# Patient Record
Sex: FEMALE | Race: WHITE | Employment: UNEMPLOYED | URBAN - METROPOLITAN AREA
[De-identification: names, ages, dates, MRNs, and addresses within clinical notes are randomized per-mention and may not be internally consistent; named-entity substitution may affect disease eponyms.]

---

## 2017-03-09 ENCOUNTER — OFFICE VISIT (OUTPATIENT)
Dept: BEHAVIORAL/MENTAL HEALTH CLINIC | Age: 57
End: 2017-03-09

## 2017-03-09 VITALS
BODY MASS INDEX: 25.01 KG/M2 | HEART RATE: 70 BPM | HEIGHT: 68 IN | SYSTOLIC BLOOD PRESSURE: 123 MMHG | DIASTOLIC BLOOD PRESSURE: 66 MMHG | WEIGHT: 165 LBS

## 2017-03-09 DIAGNOSIS — F33.0 MAJOR DEPRESSIVE DISORDER, RECURRENT, MILD (HCC): Primary | ICD-10-CM

## 2017-03-09 DIAGNOSIS — F43.10 POST TRAUMATIC STRESS DISORDER (PTSD): ICD-10-CM

## 2017-03-09 DIAGNOSIS — F43.21 GRIEF: ICD-10-CM

## 2017-03-09 RX ORDER — BUSPIRONE HYDROCHLORIDE 5 MG/1
5 TABLET ORAL 2 TIMES DAILY
Qty: 60 TAB | Refills: 1 | Status: SHIPPED | OUTPATIENT
Start: 2017-03-09 | End: 2017-04-20 | Stop reason: SDUPTHER

## 2017-03-09 NOTE — MR AVS SNAPSHOT
Visit Information Date & Time Provider Department Dept. Phone Encounter #  
 3/9/2017  2:30 PM Siobhan Hurst NP [facility] [phone] [id number] Follow-up Instructions Return in about 6 weeks (around 4/20/2017). Upcoming Health Maintenance Date Due Hepatitis C Screening 1960 FOBT Q 1 YEAR AGE 50-75 5/22/2008 INFLUENZA AGE 9 TO ADULT 8/1/2016 BREAST CANCER SCRN MAMMOGRAM 6/24/2018 PAP AKA CERVICAL CYTOLOGY 6/24/2019 DTaP/Tdap/Td series (2 - Td) 10/22/2023 Allergies as of 3/9/2017  Review Complete On: 3/9/2017 By: Siobhan Hurst NP Severity Noted Reaction Type Reactions Other Medication  08/25/2016    Nausea and Vomiting Steroids Current Immunizations  Reviewed on 10/22/2013 Name Date Influenza Vaccine 10/8/2013 Tdap 10/22/2013 Not reviewed this visit You Were Diagnosed With   
  
 Codes Comments Major depressive disorder, recurrent, mild ([facility]    -  Primary ICD-10-CM: F33.0 ICD-9-CM: 296.31 Post traumatic stress disorder (PTSD)     ICD-10-CM: F43.10 ICD-9-CM: 309.81 Grief     ICD-10-CM: P45.05 ICD-9-CM: 309.0 Vitals BP Pulse Height(growth percentile) Weight(growth percentile) LMP BMI  
 123/66 70 5' 8\" (1.727 m) 165 lb (74.8 kg) 12/25/2013 25.09 kg/m2 OB Status Smoking Status Postmenopausal Never Smoker BMI and BSA Data Body Mass Index Body Surface Area 25.09 kg/m 2 1.89 m 2 Preferred Pharmacy Pharmacy Name Phone [facility] [address], [address] [place] [address] [phone] Your Updated Medication List  
  
   
This list is accurate as of: 3/9/17  3:07 PM.  Always use your most recent med list.  
  
  
  
  
 busPIRone 5 mg tablet Commonly known as:  BUSPAR Take 1 Tab by mouth two (2) times a day. Prescriptions Sent to Pharmacy  Refills  
 busPIRone (BUSPAR) 5 mg tablet 1  
 Sig: Take 1 Tab by mouth two (2) times a day. Class: Normal  
 Pharmacy: [facility] [address], [address]  #: [phone] Route: Oral  
  
Follow-up Instructions Return in about 6 weeks (around 4/20/2017). Introducing \[facility]\"" & HEALTH SERVICES! Dear Malorie Martínez: 
Thank you for requesting a Decision Curve account. Our records indicate that you already have an active Decision Curve account. You can access your account anytime at [email or web address]. [email or web address] Did you know that you can access your hospital and ER discharge instructions at any time in Decision Curve? You can also review all of your test results from your hospital stay or ER visit. Additional Information If you have questions, please visit the Frequently Asked Questions section of the Decision Curve website at [email or web address] Remember, Decision Curve is NOT to be used for urgent needs. For medical emergencies, dial 911. Now available from your iPhone and Android! Please provide this summary of care documentation to your next provider. Your primary care clinician is listed as Radha Aguilar. If you have any questions after today's visit, please call [phone].

## 2017-03-09 NOTE — PROGRESS NOTES
CHIEF COMPLAINT:  Elkin Brooks is a 60 y.o. female and was seen today for follow-up of psychiatric condition and psychotropic medication management. HPI:    Black Hicks reports the following psychiatric symptoms: depression and  anxiety. The symptoms have been present for months and are of moderate severity. The depressive symptoms occur less frequently overall. Pt continues to note increased anxiety with GI distress. She has been off of SSRI for approx 30 days. Pt here today to review current treatment plan. FAMILY/SOCIAL HX: plans to re- in the near future    REVIEW OF SYSTEMS:  Psychiatric:  depression, anxiety  Appetite:good, GI distress at times   Sleep: fitful   Neuro: denies    Visit Vitals    /66    Pulse 70    Ht 5' 8\" (1.727 m)    Wt 74.8 kg (165 lb)    LMP 12/25/2013    BMI 25.09 kg/m2       Side Effects:  none, unclear if zoloft was causing some of the GI distress     MENTAL STATUS EXAM:   Sensorium  oriented to time, place and person   Relations cooperative   Appearance:  age appropriate and casually dressed   Motor Behavior:  gait stable and within normal limits   Speech:  normal volume   Thought Process: goal directed   Thought Content free of delusions and free of hallucinations   Suicidal ideations no intention   Homicidal ideations no intention   Mood:  anxiuous   Affect:  anxious   Memory recent  adequate   Memory remote:  adequate   Concentration:  adequate   Abstraction:  abstract   Insight:  fair and good   Reliability good and fair   Judgment:  fair and good     MEDICAL DECISION MAKING:  Problems addressed today:    ICD-10-CM ICD-9-CM    1. Major depressive disorder, recurrent, mild (HCC) F33.0 296.31    2. Post traumatic stress disorder (PTSD) F43.10 309.81    3. Grief F43.20 309.0        Assessment:   Black Hicks is not responding to treatment, symptoms are currently exacerbated. Pt reports depressive symptoms are fairly stable overall.  She has noticed an increase in anxiety. She was not able to tolerate zoloft due to GI SE's and so has stopped use. Reviewed past symptom presentations and meds. At this time will focus primarily on anxiety. Discussed options and will use buspar. Answered quesitons. Pt discussed current stressors. Provided support and encouragement. Plan:   1. Current Outpatient Prescriptions   Medication Sig Dispense Refill    busPIRone (BUSPAR) 5 mg tablet Take 1 Tab by mouth two (2) times a day. 60 Tab 1          medication changes made today: dc zoloft, start buspar 5 mg bid for anxiety    2. Counseling and coordination of care including instructions for treatment, risks/benefits, risk factor reduction and patient/family education. She agrees with the plan. Patient instructed to call with any side effects, questions or issues. 3.  Follow-up Disposition:  Return in about 6 weeks (around 4/20/2017).      4. Ind therapy prn    3/9/2017  Bob Fall NP

## 2017-04-20 ENCOUNTER — OFFICE VISIT (OUTPATIENT)
Dept: BEHAVIORAL/MENTAL HEALTH CLINIC | Age: 57
End: 2017-04-20

## 2017-04-20 VITALS
SYSTOLIC BLOOD PRESSURE: 120 MMHG | WEIGHT: 158 LBS | BODY MASS INDEX: 23.95 KG/M2 | HEART RATE: 60 BPM | HEIGHT: 68 IN | DIASTOLIC BLOOD PRESSURE: 64 MMHG

## 2017-04-20 DIAGNOSIS — F43.21 GRIEF: ICD-10-CM

## 2017-04-20 DIAGNOSIS — F43.10 POST TRAUMATIC STRESS DISORDER (PTSD): ICD-10-CM

## 2017-04-20 DIAGNOSIS — F33.0 MAJOR DEPRESSIVE DISORDER, RECURRENT, MILD (HCC): Primary | ICD-10-CM

## 2017-04-20 RX ORDER — BUSPIRONE HYDROCHLORIDE 10 MG/1
10 TABLET ORAL DAILY
Qty: 30 TAB | Refills: 3 | Status: SHIPPED | OUTPATIENT
Start: 2017-04-20 | End: 2017-08-16

## 2017-04-20 NOTE — PROGRESS NOTES
CHIEF COMPLAINT:  Yolis Michele is a 60 y.o. female and was seen today for follow-up of psychiatric condition and psychotropic medication management. HPI:    Yesenia Clayton reports the following psychiatric symptoms:  depression and anxiety. The depressive symptoms have been stable but anxiety symptoms have been present for several months and are of moderate/high severity. The symptoms occur daily. Pt reports some benefit from current medications. Pt here today to review current treatment plan. FAMILY/SOCIAL HX: concerned about primary relationship    REVIEW OF SYSTEMS:  Psychiatric:  depression, anxiety  Appetite:decreased   Sleep: good overall  Neuro: denies    Visit Vitals    /64    Pulse 60    Ht 5' 8\" (1.727 m)    Wt 71.7 kg (158 lb)    LMP 12/25/2013    BMI 24.02 kg/m2       Side Effects:  none currently    MENTAL STATUS EXAM:   Sensorium  oriented to time, place and person   Relations cooperative   Appearance:  age appropriate and casually dressed   Motor Behavior:  gait stable and within normal limits   Speech:  normal volume   Thought Process: goal directed   Thought Content free of delusions and free of hallucinations   Suicidal ideations no intention   Homicidal ideations no intention   Mood:  anxious   Affect:  anxious   Memory recent  adequate   Memory remote:  adequate   Concentration:  adequate   Abstraction:  abstract   Insight:  fair and good   Reliability good   Judgment:  fair and good     MEDICAL DECISION MAKING:  Problems addressed today:    ICD-10-CM ICD-9-CM    1. Major depressive disorder, recurrent, mild (HCC) F33.0 296.31    2. Post traumatic stress disorder (PTSD) F43.10 309.81    3. Grief F43.20 309.0        Assessment:   Yesenia Clayton is responding to treatment, anxiety symptoms are still exacerbated. Pt reports benefit from use of buspar. She has been consistently using 5 mg tab and at times has taken an extra 1-2 tabs depending on anxiety symptoms.  Will increase daily dose to 10 mg and pt may take an extra tab if needed. Pt also with sig stressors. Discussed strategies for enhanced coping. Reviewed healthy boundaries and cognitions. Pt open and engaged in practice. Provided support and encouragement. Plan:   1. Current Outpatient Prescriptions   Medication Sig Dispense Refill    busPIRone (BUSPAR) 10 mg tablet Take 1 Tab by mouth daily. 30 Tab 3          medication changes made today: increase buspar 10 mg daily    2. Counseling and coordination of care including instructions for treatment, risks/benefits, risk factor reduction and patient/family education. She agrees with the plan. Patient instructed to call with any side effects, questions or issues.      3.  Follow-up Disposition:  Return in about 2 weeks (around 5/4/2017).    4/20/2017  Chris Pappas NP

## 2017-04-20 NOTE — MR AVS SNAPSHOT
Visit Information Date & Time Provider Department Dept. Phone Encounter #  
 4/20/2017  9:30 AM Louis Churchill NP [address] [facility] [phone] [id number] Follow-up Instructions Return in about 2 weeks (around 5/4/2017). Upcoming Health Maintenance Date Due Hepatitis C Screening 1960 FOBT Q 1 YEAR AGE 50-75 5/22/2008 INFLUENZA AGE 9 TO ADULT 8/1/2016 BREAST CANCER SCRN MAMMOGRAM 6/24/2018 PAP AKA CERVICAL CYTOLOGY 6/24/2019 DTaP/Tdap/Td series (2 - Td) 10/22/2023 Allergies as of 4/20/2017  Review Complete On: 4/20/2017 By: Louis Churchill NP Severity Noted Reaction Type Reactions Other Medication  08/25/2016    Nausea and Vomiting Steroids Current Immunizations  Reviewed on 10/22/2013 Name Date Influenza Vaccine 10/8/2013 Tdap 10/22/2013 Not reviewed this visit You Were Diagnosed With   
  
 Codes Comments Major depressive disorder, recurrent, mild ([facility]    -  Primary ICD-10-CM: F33.0 ICD-9-CM: 296.31 Post traumatic stress disorder (PTSD)     ICD-10-CM: F43.10 ICD-9-CM: 309.81 Grief     ICD-10-CM: D41.05 ICD-9-CM: 309.0 Vitals BP Pulse Height(growth percentile) Weight(growth percentile) LMP BMI  
 120/64 60 5' 8\" (1.727 m) 158 lb (71.7 kg) 12/25/2013 24.02 kg/m2 OB Status Smoking Status Postmenopausal Never Smoker BMI and BSA Data Body Mass Index Body Surface Area 24.02 kg/m 2 1.85 m 2 Preferred Pharmacy Pharmacy Name Phone [facility] [address], [address] [place] [address] [phone] Your Updated Medication List  
  
   
This list is accurate as of: 4/20/17 10:04 AM.  Always use your most recent med list.  
  
  
  
  
 busPIRone 10 mg tablet Commonly known as:  BUSPAR Take 1 Tab by mouth daily. Prescriptions Sent to Pharmacy  Refills  
 busPIRone (BUSPAR) 10 mg tablet 3  
 Sig: Take 1 Tab by mouth daily. Class: Normal  
 Pharmacy: [facility] [address], [address] [facility] #: [phone] Route: Oral  
  
Follow-up Instructions Return in about 2 weeks (around 5/4/2017). Introducing [facility] SERVICES! Dear Susan De León: 
Thank you for requesting a CloudMedx account. Our records indicate that you already have an active CloudMedx account. You can access your account anytime at [email or web address] [employer]/Decision Diagnostics Did you know that you can access your hospital and ER discharge instructions at any time in CloudMedx? You can also review all of your test results from your hospital stay or ER visit. Additional Information If you have questions, please visit the Frequently Asked Questions section of the CloudMedx website at [email or web address] Remember, CloudMedx is NOT to be used for urgent needs. For medical emergencies, dial 911. Now available from your iPhone and Android! Please provide this summary of care documentation to your next provider. Your primary care clinician is listed as Marilou Dials. If you have any questions after today's visit, please call [phone].

## 2017-05-10 ENCOUNTER — OFFICE VISIT (OUTPATIENT)
Dept: BEHAVIORAL/MENTAL HEALTH CLINIC | Age: 57
End: 2017-05-10

## 2017-05-10 VITALS
DIASTOLIC BLOOD PRESSURE: 63 MMHG | BODY MASS INDEX: 23.79 KG/M2 | WEIGHT: 157 LBS | SYSTOLIC BLOOD PRESSURE: 106 MMHG | HEIGHT: 68 IN | HEART RATE: 61 BPM

## 2017-05-10 DIAGNOSIS — F33.0 MAJOR DEPRESSIVE DISORDER, RECURRENT, MILD (HCC): Primary | ICD-10-CM

## 2017-05-10 DIAGNOSIS — F43.10 POST TRAUMATIC STRESS DISORDER (PTSD): ICD-10-CM

## 2017-05-10 DIAGNOSIS — F43.21 GRIEF: ICD-10-CM

## 2017-05-10 NOTE — MR AVS SNAPSHOT
Visit Information Date & Time Provider Department Dept. Phone Encounter #  
 5/10/2017  2:30 PM Kj Wise NP [place] [address] [phone] [id number] Follow-up Instructions Return in about 4 weeks (around 6/7/2017). Upcoming Health Maintenance Date Due Hepatitis C Screening 1960 FOBT Q 1 YEAR AGE 50-75 5/22/2008 INFLUENZA AGE 9 TO ADULT 8/1/2017 BREAST CANCER SCRN MAMMOGRAM 6/24/2018 PAP AKA CERVICAL CYTOLOGY 6/24/2019 DTaP/Tdap/Td series (2 - Td) 10/22/2023 Allergies as of 5/10/2017  Review Complete On: 5/10/2017 By: Kj Wise NP Severity Noted Reaction Type Reactions Other Medication  08/25/2016    Nausea and Vomiting Steroids Current Immunizations  Reviewed on 10/22/2013 Name Date Influenza Vaccine 10/8/2013 Tdap 10/22/2013 Not reviewed this visit You Were Diagnosed With   
  
 Codes Comments Major depressive disorder, recurrent, mild ([facility]    -  Primary ICD-10-CM: F33.0 ICD-9-CM: 296.31 Post traumatic stress disorder (PTSD)     ICD-10-CM: F43.10 ICD-9-CM: 309.81 Grief     ICD-10-CM: P92.48 ICD-9-CM: 309.0 Vitals BP Pulse Height(growth percentile) Weight(growth percentile) LMP BMI  
 106/63 61 5' 8\" (1.727 m) 157 lb (71.2 kg) 12/25/2013 23.87 kg/m2 OB Status Smoking Status Postmenopausal Never Smoker BMI and BSA Data Body Mass Index Body Surface Area  
 23.87 kg/m 2 1.85 m 2 Preferred Pharmacy Pharmacy Name Phone [facility] [address], [address] [place] [address] Avenue [phone] Your Updated Medication List  
  
   
This list is accurate as of: 5/10/17  3:07 PM.  Always use your most recent med list.  
  
  
  
  
 busPIRone 10 mg tablet Commonly known as:  BUSPAR Take 1 Tab by mouth daily. Follow-up Instructions Return in about 4 weeks (around 6/7/2017). Introducing [facility] & HEALTH SERVICES! Dear Shereen Rangel: 
Thank you for requesting a Sparxent account. Our records indicate that you already have an active Sparxent account. You can access your account anytime at [email or web address] Did you know that you can access your hospital and ER discharge instructions at any time in Sparxent? You can also review all of your test results from your hospital stay or ER visit. Additional Information If you have questions, please visit the Frequently Asked Questions section of the Sparxent website at [email or web address] Remember, Sparxent is NOT to be used for urgent needs. For medical emergencies, dial 911. Now available from your iPhone and Android! Please provide this summary of care documentation to your next provider. Your primary care clinician is listed as Osvaldo Dale. If you have any questions after today's visit, please call [phone].

## 2017-05-10 NOTE — PROGRESS NOTES
CHIEF COMPLAINT:  Danielle Copeland is a 60 y.o. female and was seen today for follow-up of psychiatric condition and psychotropic medication management. HPI:    Terrence Ozuna reports the following psychiatric symptoms:  depression and anxiety. The symptoms have been present for several weeks and are of moderate severity. The symptoms occur somewhat less severely with use of buspar. Pt here today for therapy and to review current medication. REVIEW OF SYSTEMS:  Psychiatric:  depression, anxiety  Appetite:improved   Sleep: improved     Side Effects:  none    MENTAL STATUS EXAM:   Sensorium  oriented to time, place and person   Relations cooperative   Appearance:  age appropriate and casually dressed   Motor Behavior:  gait stable and within normal limits   Speech:  normal volume   Thought Process: goal directed   Thought Content free of delusions and free of hallucinations   Suicidal ideations no intention   Homicidal ideations no intention   Mood:  anxious   Affect:  anxious   Memory recent  adequate   Memory remote:  adequate   Concentration:  adequate   Abstraction:  abstract   Insight:  Fair/good   Reliability good   Judgment:  good         MEDICAL DECISION MAKING:  Problems addressed today:    ICD-10-CM ICD-9-CM    1. Major depressive disorder, recurrent, mild (HCC) F33.0 296.31    2. Post traumatic stress disorder (PTSD) F43.10 309.81    3. Grief F43.20 309.0        Assessment:   Terrence Ozuna is responding to treatment, symptoms are stabilizing. Will cont with current medication. Plan:   1. Current Outpatient Prescriptions   Medication Sig Dispense Refill    busPIRone (BUSPAR) 10 mg tablet Take 1 Tab by mouth daily. 30 Tab 3          medication changes: cont buspar 10 mg daily    2. Counseling and coordination of care including instructions for treatment, risks/benefits, risk factor reduction and patient/family education. She agrees with the plan.  Patient instructed to call with any side effects, questions or issues. 3.  Follow-up Disposition:  Return in about 4 weeks (around 6/7/2017). PSYCHOTHERAPY:  approx 20 minutes  Type:  Supportive, cognitive, psychodynamic  Focus:  Relationship with her mother, view of self  Maylissabecka Ozuna is progressing. Pt continues to be open and engaged.      Jared Jenkins NP  5/10/2017

## 2017-08-08 ENCOUNTER — OFFICE VISIT (OUTPATIENT)
Dept: INTERNAL MEDICINE CLINIC | Age: 57
End: 2017-08-08

## 2017-08-08 VITALS
HEART RATE: 74 BPM | OXYGEN SATURATION: 98 % | TEMPERATURE: 98 F | RESPIRATION RATE: 14 BRPM | BODY MASS INDEX: 23.52 KG/M2 | DIASTOLIC BLOOD PRESSURE: 78 MMHG | HEIGHT: 68 IN | WEIGHT: 155.2 LBS | SYSTOLIC BLOOD PRESSURE: 116 MMHG

## 2017-08-08 DIAGNOSIS — M79.7 FIBROMYALGIA: ICD-10-CM

## 2017-08-08 DIAGNOSIS — Z66 DNR (DO NOT RESUSCITATE): ICD-10-CM

## 2017-08-08 DIAGNOSIS — Z12.31 ENCOUNTER FOR SCREENING MAMMOGRAM FOR MALIGNANT NEOPLASM OF BREAST: ICD-10-CM

## 2017-08-08 DIAGNOSIS — D22.9 NUMEROUS MOLES: ICD-10-CM

## 2017-08-08 DIAGNOSIS — N39.0 URINARY TRACT INFECTION WITHOUT HEMATURIA, SITE UNSPECIFIED: ICD-10-CM

## 2017-08-08 DIAGNOSIS — Z00.00 MEDICARE ANNUAL WELLNESS VISIT, SUBSEQUENT: Primary | ICD-10-CM

## 2017-08-08 DIAGNOSIS — E04.2 MULTIPLE THYROID NODULES: ICD-10-CM

## 2017-08-08 LAB
BILIRUB UR QL STRIP: NEGATIVE
GLUCOSE UR-MCNC: NEGATIVE MG/DL
KETONES P FAST UR STRIP-MCNC: NEGATIVE MG/DL
PH UR STRIP: 5.5 [PH] (ref 4.6–8)
PROT UR QL STRIP: NEGATIVE MG/DL
SP GR UR STRIP: 1.02 (ref 1–1.03)
UA UROBILINOGEN AMB POC: NORMAL (ref 0.2–1)
URINALYSIS CLARITY POC: CLEAR
URINALYSIS COLOR POC: NORMAL
URINE BLOOD POC: NEGATIVE
URINE LEUKOCYTES POC: NEGATIVE
URINE NITRITES POC: NEGATIVE

## 2017-08-08 NOTE — PROGRESS NOTES
HISTORY OF PRESENT ILLNESS  Minus Sergio is a 59 y.o. female. HPI  Pyelonephritis Follow-up  She was treated in ED 17 in [place] for Pyleo. Report resolved symptoms: no longer has back pain or  dysuria. FIbromyalgia  No longer having improvement with Cymbalta. She would like transition to another provider. 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 History     Past Medical History:   Diagnosis Date    Depression     FH: diabetes mellitus     Fibromyalgia     Hypercholesterolemia     Stroke [facility]) 5848,85, 2012    right sided      Past Surgical History:   Procedure Laterality Date    HX  SECTION      HX TONSILLECTOMY       Current Outpatient Prescriptions   Medication Sig Dispense Refill    busPIRone (BUSPAR) 10 mg tablet Take 1 Tab by mouth daily.  30 Tab 3     Allergies   Allergen Reactions    Other Medication Nausea and Vomiting     Steroids     Family History   Problem Relation Age of Onset    Diabetes Mother     Diabetes Father     Cancer Father     Lung Disease Sister      pulmonary fibrosis, on O2    Diabetes Brother      Social History   Substance Use Topics    Smoking status: Never Smoker    Smokeless tobacco: Never Used    Alcohol use No     Patient Active Problem List   Diagnosis Code    FH: diabetes mellitus Z83.3    Fibromyalgia M79.7    Encounter for long-term (current) use of other medications Z79.899    Major depressive disorder, recurrent episode, in partial remission ([facility] F33.41    Post traumatic stress disorder (PTSD) F43.10    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 No    Do you average more than 7 drinks per week? No        Functional Ability and Level of Safety:     Hearing Loss   none    Activities of Daily Living   Self-care. Requires assistance with: no ADLs    Fall Risk     Fall Risk Assessment, last 12 mths 8/8/2017   Able to walk? Yes   Fall in past 12 months? No     Abuse Screen   Patient is not abused      Review of Systems   Constitutional: Negative for chills, fever and weight loss. HENT: Negative for congestion and sore throat. Eyes: Negative for blurred vision and double vision. Respiratory: Negative for cough and shortness of breath. Cardiovascular: Negative for chest pain, orthopnea and leg swelling. Gastrointestinal: Negative for abdominal pain, blood in stool, constipation, diarrhea, heartburn, nausea and vomiting. Genitourinary: Negative for frequency and urgency. Musculoskeletal: Negative for joint pain and myalgias. Neurological: Negative for dizziness, tremors, weakness and headaches. Endo/Heme/Allergies: Does not bruise/bleed easily. Psychiatric/Behavioral: Negative for depression and suicidal ideas. Physical Exam   Constitutional: She is oriented to person, place, and time. She appears well-developed and well-nourished. HENT:   Right Ear: External ear normal.   Left Ear: External ear normal.   Mouth/Throat: Oropharynx is clear and moist. No oropharyngeal exudate. Eyes: Conjunctivae are normal. No scleral icterus. Neck: Normal range of motion. Neck supple. No thyromegaly present. Cardiovascular: Normal rate, regular rhythm and normal heart sounds. Exam reveals no gallop and no friction rub.     No murmur heard.  Pulmonary/Chest: Effort normal and breath sounds normal. No respiratory distress. She has no wheezes. She has no rales. She exhibits no tenderness. Abdominal: Soft. Bowel sounds are normal. She exhibits no distension. There is no tenderness. There is no rebound and no guarding. Genitourinary:   Genitourinary Comments: Deferred for gyn per pt request   Musculoskeletal: Normal range of motion. She exhibits no edema or tenderness. Neurological: She is alert and oriented to person, place, and time. Skin:        Psychiatric: She has a normal mood and affect. Happy        Evaluation of Cognitive Function:  Mood/affect:  happy  Appearance: well dressed  Family member/caregiver input: n/a       Patient Care Team:  Aidan Quintero MD as PCP - General (Internal Medicine)  Coleman Batista MD (Endocrinology)  Chester Griggs MD (Rheumatology)        ASSESSMENT and PLAN  Advice/Referrals/Counseling   Education and counseling provided:  Are appropriate based on today's review and evaluation  End-of-Life planning (with patient's consent)      Assessment/Plan   Diagnoses and all orders for this visit:    1. Medicare annual wellness visit, subsequent- Health Maintenance reviewed - patient asked to schedule her mammogram, and dermatology appt for skin check. 2. Urinary tract infection without hematuria, site unspecified- resolved pyelo  -     OCCULT BLOOD, IMMUNOASSAY (FIT)  -     AMB POC URINALYSIS DIP STICK AUTO W/O MICRO    3. Fibromyalgia- would like to transition care. Currently not on pain medication. Using lifestyle management   -     REFERRAL TO RHEUMATOLOGY    4. Multiple thyroid nodules-per Endocrinology     5. DNR (do not resuscitate)- placed on file      Follow-up Disposition:  Return in about 1 year (around 8/8/2018) for Medicare Wellness, Physical - 30 minute appointment. Medication risks/benefits/costs/interactions/alternatives discussed with patient.   Jessica Neff  was given an after visit summary which includes diagnoses, current medications, & vitals. she expressed understanding with the diagnosis and plan.

## 2017-08-08 NOTE — MR AVS SNAPSHOT
Visit Information Date & Time Provider Department Dept. Phone Encounter #  
 8/8/2017  9:30 AM Marilee Lehman MD [facility] Internal Medicine [phone] [id number] Follow-up Instructions Return in about 1 year (around 8/8/2018) for Medicare Wellness, Physical - 30 minute appointment. Upcoming Health Maintenance Date Due Hepatitis C Screening 1960 FOBT Q 1 YEAR AGE 50-75 5/22/2008 INFLUENZA AGE 9 TO ADULT 8/1/2017 BREAST CANCER SCRN MAMMOGRAM 6/24/2018 PAP AKA CERVICAL CYTOLOGY 6/24/2019 DTaP/Tdap/Td series (2 - Td) 10/22/2023 Allergies as of 8/8/2017  Review Complete On: 8/8/2017 By: Marilee Lehman MD  
  
 Severity Noted Reaction Type Reactions Other Medication  08/25/2016    Nausea and Vomiting Steroids Current Immunizations  Reviewed on 10/22/2013 Name Date Influenza Vaccine 10/8/2013 Tdap 10/22/2013 Not reviewed this visit You Were Diagnosed With   
  
 Codes Comments Medicare annual wellness visit, subsequent    -  Primary ICD-10-CM: Z00.00 ICD-9-CM: V70.0 Urinary tract infection without hematuria, site unspecified     ICD-10-CM: N39.0 ICD-9-CM: 599.0 Fibromyalgia     ICD-10-CM: M79.7 ICD-9-CM: 729.1 Multiple thyroid nodules     ICD-10-CM: E04.2 ICD-9-CM: 771. 1 Vitals BP Pulse Temp Resp Height(growth percentile) Weight(growth percentile) 116/78 (BP 1 Location: Right arm, BP Patient Position: Sitting) 74 98 °F (36.7 °C) (Oral) 14 5' 8\" (1.727 m) 155 lb 3.2 oz (70.4 kg) LMP SpO2 BMI OB Status Smoking Status 12/25/2013 98% 23.6 kg/m2 Postmenopausal Never Smoker Vitals History BMI and BSA Data Body Mass Index Body Surface Area  
 23.6 kg/m 2 1.84 m 2 Preferred Pharmacy Pharmacy Name Phone [address] [address] [phone] Your Updated Medication List  
  
   
 This list is accurate as of: 8/8/17 10:07 AM.  Always use your most recent med list.  
  
  
  
  
 busPIRone 10 mg tablet Commonly known as:  BUSPAR Take 1 Tab by mouth daily. We Performed the Following AMB POC URINALYSIS DIP STICK AUTO W/O MICRO [18273 CPT(R)] OCCULT BLOOD, IMMUNOASSAY (FIT) [id number] CPT(R)] REFERRAL TO RHEUMATOLOGY [FAC12 Custom] Follow-up Instructions Return in about 1 year (around 8/8/2018) for Medicare Wellness, Physical - 30 minute appointment. Referral Information Referral ID Referred By Referred To  
  
 [id number] Vinicio MORRIS MD   
   [address] [place], [address] [place] [address] Phone: [phone] Fax: [phone] Visits Status Start Date End Date 1 New Request 8/8/17 8/8/18 If your referral has a status of pending review or denied, additional information will be sent to support the outcome of this decision. Patient Instructions It was a pleasure to see you again! I'm glad you are better. Congratulations on your upcoming wedding! I will obtain your labs from your endocrinologist  
 
Please provide a copy your living will. Fecal Occult Blood Test (FOBT): About This Test 
What is it? A fecal occult blood test checks for hidden (occult) blood in the stool. You place a small sample of stool on a chemically treated card, pad, or wipe. Then a special chemical solution is put on top of the sample. If the card, pad, or wipe turns blue, there is blood in the stool sample. Why is this test done? A fecal occult blood test is done to check for colorectal cancer and other types of gastrointestinal problems. These include hemorrhoids, anal fissures, and colon polyps, which can cause blood in the stools. How can you prepare for the test? 
Before you do a fecal occult blood test, avoid the following for 2 to 3 days before the test: · Eating turnips, beets, radishes, horseradish, artichokes, mushrooms, broccoli, bean sprouts, cauliflower, apples, oranges, bananas, grapes, and melon. These foods can cause the test to be positive for blood when blood is not in the stool. · Eating red meat. This may cause false test results. Small amounts of chicken, turkey, or fish will not affect the test. 
· Taking iron supplements · Taking aspirin (or products that contain aspirin) and nonsteroidal anti-inflammatory drugs (NSAIDs). Also avoid taking other medicines that irritate the stomach or intestines. · Taking vitamin C supplements Do not do the test during your menstrual period or if you are having bleeding from hemorrhoids. And don't test a stool sample that has been in contact with toilet bowl cleaning products that turn the water blue. What happens during the test? 
You can check for blood in your stool at home. There are different types of home tests you can use. Make sure to follow the instructions that come with any test. For most tests, you will use stool samples from three different bowel movements over three different days. General instructions For any home test, follow these guidelines: 
· Check the expiration date on the package. Do not use a test kit after its expiration date. The chemicals in the kit may not work properly after that date. · Store the test kit as instructed. Many kits need to be stored in a refrigerator or cool place. · Read all of the instructions for your test closely before doing the test. Be aware of any special things you need to do before the test. This may include not eating certain foods or limiting your physical activity. · Follow the instructions exactly. Do all the steps in order, without skipping any of them. · If a step in the test needs to be timed, use a watch. Don't guess at the timing.  
· If you are color-blind or have trouble seeing colors, have someone else read the test results for you. Most test results are color changes on a test strip. · Record the results of the test so you can discuss them with your doctor. Stool guaiac cards These instructions are for one of the most common tests used to find blood in the stool. · Complete the information on the front of each card. · During a bowel movement, collect a small amount of stool on one end of an applicator. You might try catching the stool on some plastic wrap draped loosely over the toilet bowl and held in place by the toilet seat. If you use a container to collect the stool, first clean and rinse it well. This will get rid of any substance that may affect the test results. · Apply a thin smear of stool inside box A. 
· Reuse the same applicator to get a second sample from a different part of the stool. Apply a thin smear inside box B. 
· Close the cover of the slide. · Complete the remaining two cards in the same way for two other bowel movements. · Return all the samples right after you collect the last sample. Other test kits · Some kits tell you to use a special cloth to wipe with after a bowel movement. After you wipe with the cloth, you put the developer solution on it to check for a color change that means there is blood in the stool. · Other kits have a special test pad that you place in the toilet after a bowel movement. The pad will change color if the stool has blood in it. What else should you know about the test? 
A fecal occult blood test may be used to check for colorectal cancer. But it is never used to diagnose this condition. If a fecal occult blood test finds blood in the stool, you may need more tests to find the reason for the bleeding. Follow-up care is a key part of your treatment and safety. Be sure to make and go to all appointments, and call your doctor if you are having problems. It's also a good idea to keep a list of the medicines you take. Where can you learn more? Go to http://bubba-garima.info/. Enter C622 in the search box to learn more about \"Fecal Occult Blood Test (FOBT): About This Test.\" Current as of: March 16, 2017 Content Version: 11.3 © 4585-0353 [employer]. Care instructions adapted under license by [employer] (which disclaims liability or warranty for this information). If you have questions about a medical condition or this instruction, always ask your healthcare professional. [address] any warranty or liability for your use of this information. Introducing [facility]! Dear Josh Gleason: 
Thank you for requesting a "[employer] account. Our records indicate that you already have an active "[employer] account. You can access your account anytime at [email or web address] Did you know that you can access your hospital and ER discharge instructions at any time in "[employer]? You can also review all of your test results from your hospital stay or ER visit. Additional Information If you have questions, please visit the Frequently Asked Questions section of the "[employer] website at [email or web address] Remember, "[employer] is NOT to be used for urgent needs. For medical emergencies, dial 911. Now available from your iPhone and Android! Please provide this summary of care documentation to your next provider. Your primary care clinician is listed as Kade Begum. If you have any questions after today's visit, please call [phone].

## 2017-08-08 NOTE — PATIENT INSTRUCTIONS
It was a pleasure to see you again! I'm glad you are better. Congratulations on your upcoming wedding! I will obtain your labs from your endocrinologist     Please provide a copy your living will. Fecal Occult Blood Test (FOBT): About This Test  What is it? A fecal occult blood test checks for hidden (occult) blood in the stool. You place a small sample of stool on a chemically treated card, pad, or wipe. Then a special chemical solution is put on top of the sample. If the card, pad, or wipe turns blue, there is blood in the stool sample. Why is this test done? A fecal occult blood test is done to check for colorectal cancer and other types of gastrointestinal problems. These include hemorrhoids, anal fissures, and colon polyps, which can cause blood in the stools. How can you prepare for the test?  Before you do a fecal occult blood test, avoid the following for 2 to 3 days before the test:  · Eating turnips, beets, radishes, horseradish, artichokes, mushrooms, broccoli, bean sprouts, cauliflower, apples, oranges, bananas, grapes, and melon. These foods can cause the test to be positive for blood when blood is not in the stool. · Eating red meat. This may cause false test results. Small amounts of chicken, turkey, or fish will not affect the test.  · Taking iron supplements  · Taking aspirin (or products that contain aspirin) and nonsteroidal anti-inflammatory drugs (NSAIDs). Also avoid taking other medicines that irritate the stomach or intestines. · Taking vitamin C supplements  Do not do the test during your menstrual period or if you are having bleeding from hemorrhoids. And don't test a stool sample that has been in contact with toilet bowl cleaning products that turn the water blue. What happens during the test?  You can check for blood in your stool at home. There are different types of home tests you can use.  Make sure to follow the instructions that come with any test. For most tests, you will use stool samples from three different bowel movements over three different days. General instructions  For any home test, follow these guidelines:  · Check the expiration date on the package. Do not use a test kit after its expiration date. The chemicals in the kit may not work properly after that date. · Store the test kit as instructed. Many kits need to be stored in a refrigerator or cool place. · Read all of the instructions for your test closely before doing the test. Be aware of any special things you need to do before the test. This may include not eating certain foods or limiting your physical activity. · Follow the instructions exactly. Do all the steps in order, without skipping any of them. · If a step in the test needs to be timed, use a watch. Don't guess at the timing. · If you are color-blind or have trouble seeing colors, have someone else read the test results for you. Most test results are color changes on a test strip. · Record the results of the test so you can discuss them with your doctor. Stool guaiac cards  These instructions are for one of the most common tests used to find blood in the stool. · Complete the information on the front of each card. · During a bowel movement, collect a small amount of stool on one end of an applicator. You might try catching the stool on some plastic wrap draped loosely over the toilet bowl and held in place by the toilet seat. If you use a container to collect the stool, first clean and rinse it well. This will get rid of any substance that may affect the test results. · Apply a thin smear of stool inside box A.  · Reuse the same applicator to get a second sample from a different part of the stool. Apply a thin smear inside box B.  · Close the cover of the slide. · Complete the remaining two cards in the same way for two other bowel movements. · Return all the samples right after you collect the last sample.   Other test kits  · Some kits tell you to use a special cloth to wipe with after a bowel movement. After you wipe with the cloth, you put the developer solution on it to check for a color change that means there is blood in the stool. · Other kits have a special test pad that you place in the toilet after a bowel movement. The pad will change color if the stool has blood in it. What else should you know about the test?  A fecal occult blood test may be used to check for colorectal cancer. But it is never used to diagnose this condition. If a fecal occult blood test finds blood in the stool, you may need more tests to find the reason for the bleeding. Follow-up care is a key part of your treatment and safety. Be sure to make and go to all appointments, and call your doctor if you are having problems. It's also a good idea to keep a list of the medicines you take. Where can you learn more? Go to http://bubba-garima.info/. Enter N650 in the search box to learn more about \"Fecal Occult Blood Test (FOBT): About This Test.\"  Current as of: March 16, 2017  Content Version: 11.3  © 9807-2030 [employer]. Care instructions adapted under license by [employer] (which disclaims liability or warranty for this information). If you have questions about a medical condition or this instruction, always ask your healthcare professional. [address] any warranty or liability for your use of this information.

## 2017-08-08 NOTE — ACP (ADVANCE CARE PLANNING)
Advance Care Planning (ACP) Provider Note - Comprehensive     Date of ACP Conversation: 08/08/17  Persons included in Conversation:  patient  Length of ACP Conversation in minutes:  16 minutes    Authorized Decision Maker (if patient is incapable of making informed decisions): This person is:  Lupe Estrella, daughter          General ACP for ALL Patients with Decision Making Capacity:   Importance of advance care planning, including choosing a healthcare agent to communicate patient's healthcare decisions if patient lost the ability to make decisions, such as after a sudden illness or accident    Review of Existing Advance Directive:  Has Living Will     For Serious or Chronic Illness:  Understanding of CPR, goals and expected outcomes, benefits and burdens discussed. Interventions Provided:  Entered DNR order (If yes, complete Durable DNR form)   \"i'm good I know where' I'm going\"  Has discussed with her daughter.

## 2017-08-16 ENCOUNTER — OFFICE VISIT (OUTPATIENT)
Dept: BEHAVIORAL/MENTAL HEALTH CLINIC | Age: 57
End: 2017-08-16

## 2017-08-16 VITALS
DIASTOLIC BLOOD PRESSURE: 62 MMHG | BODY MASS INDEX: 23.64 KG/M2 | HEART RATE: 55 BPM | WEIGHT: 156 LBS | SYSTOLIC BLOOD PRESSURE: 106 MMHG | HEIGHT: 68 IN

## 2017-08-16 DIAGNOSIS — F43.10 POST TRAUMATIC STRESS DISORDER (PTSD): ICD-10-CM

## 2017-08-16 DIAGNOSIS — F32.5 MAJOR DEPRESSION IN REMISSION (HCC): Primary | ICD-10-CM

## 2017-08-16 DIAGNOSIS — F43.21 GRIEF: ICD-10-CM

## 2017-08-16 RX ORDER — SERTRALINE HYDROCHLORIDE 25 MG/1
25 TABLET, FILM COATED ORAL DAILY
Qty: 30 TAB | Refills: 1 | Status: SHIPPED | OUTPATIENT
Start: 2017-08-16 | End: 2017-10-12 | Stop reason: SDUPTHER

## 2017-08-16 NOTE — PROGRESS NOTES
CHIEF COMPLAINT:  Joseph Lam is a 59 y.o. female and was seen today for follow-up of psychiatric condition and psychotropic medication management. HPI:    Jack Parikh reports the following psychiatric symptoms:  depression and anxiety. The symptoms have been present for months and are of moderate severity. The symptoms occur somewhat more frequently now that she is off of all medication. Pt here today to review current treatment plan. FAMILY/SOCIAL HX: pt getting  in Oct    REVIEW OF SYSTEMS:  Psychiatric:  depression, anxiety  Appetite:decreased, nausea with eating    Sleep: fitful at times but improved overall  Neuro: denies    Visit Vitals    /62    Pulse (!) 55    Ht 5' 8\" (1.727 m)    Wt 70.8 kg (156 lb)    LMP 12/25/2013    BMI 23.72 kg/m2       Side Effects:  none    MENTAL STATUS EXAM:   Sensorium  oriented to time, place and person   Relations cooperative   Appearance:  age appropriate and casually dressed   Motor Behavior:  gait stable and within normal limits   Speech:  normal volume   Thought Process: goal directed   Thought Content free of delusions and free of hallucinations   Suicidal ideations no intention   Homicidal ideations no intention   Mood:  anxious and sad   Affect:  anxious and sad   Memory recent  adequate   Memory remote:  adequate   Concentration:  adequate   Abstraction:  abstract   Insight:  good   Reliability good   Judgment:  good     MEDICAL DECISION MAKING:  Problems addressed today:    ICD-10-CM ICD-9-CM    1. Major depression in remission ([facility] F32.5 296.25    2. Post traumatic stress disorder (PTSD) F43.10 309.81    3. Grief F43.20 309.0        Assessment:   Jack Parikh is not responding to treatment, symptoms are currently exacerbated. Pt had a severe UTI/kidney infection and stopped use of buspar at that time. Pt decided not to go back on it as she did not think it was of benefit for anxiety.  She prefers at this time to re-start zoloft which has been helpful in the past. Will re-start low dose. Discussed strategies for stress/anxiety management. Pt discussed grief issues and provided support. Reinforced work she has done for ongoing health and healing. Plan:   1. Current Outpatient Prescriptions   Medication Sig Dispense Refill    sertraline (ZOLOFT) 25 mg tablet Take 1 Tab by mouth daily. 30 Tab 1          medication changes made today: dc buspar, re-start zoloft 25 mg (pt will start with 1/2 tab and increase as tolerated)    2. Counseling and coordination of care including instructions for treatment, risks/benefits, risk factor reduction and patient/family education. She agrees with the plan. Patient instructed to call with any side effects, questions or issues. 3.  Follow-up Disposition:  Return in about 4 weeks (around 9/13/2017).      4. Lifestyle modification: nutrition, stress reduction and exercise    8/16/2017  Sarah Eid NP

## 2017-08-16 NOTE — MR AVS SNAPSHOT
Visit Information Date & Time Provider Department Dept. Phone Encounter #  
 8/16/2017  9:30 AM Cliff Traylor NP [address] [facility] [phone] [id number] Follow-up Instructions Return in about 4 weeks (around 9/13/2017). Your Appointments 8/13/2018 10:00 AM  
PHYSICAL with Leo Copeland MD  
[address] [address] Internal Medicine [facility]) Appt Note: cpe no pap  
 [address] [facility] [address] [facility] [address]  
[address] [facility] [address] Upcoming Health Maintenance Date Due Hepatitis C Screening 1960 FOBT Q 1 YEAR AGE 50-75 5/22/2008 INFLUENZA AGE 9 TO ADULT 8/1/2017 BREAST CANCER SCRN MAMMOGRAM 6/24/2018 PAP AKA CERVICAL CYTOLOGY 6/24/2019 DTaP/Tdap/Td series (2 - Td) 10/22/2023 Allergies as of 8/16/2017  Review Complete On: 8/16/2017 By: Cliff Traylor NP Severity Noted Reaction Type Reactions Other Medication  08/25/2016    Nausea and Vomiting Steroids Current Immunizations  Reviewed on 10/22/2013 Name Date Influenza Vaccine 10/8/2013 Tdap 10/22/2013 Not reviewed this visit You Were Diagnosed With   
  
 Codes Comments Major depression in remission [facility])    -  Primary ICD-10-CM: F32.5 ICD-9-CM: 296.25 Post traumatic stress disorder (PTSD)     ICD-10-CM: F43.10 ICD-9-CM: 309.81 Grief     ICD-10-CM: I76.01 ICD-9-CM: 309.0 Vitals BP Pulse Height(growth percentile) Weight(growth percentile) LMP BMI  
 106/62 (!) 55 5' 8\" (1.727 m) 156 lb (70.8 kg) 12/25/2013 23.72 kg/m2 OB Status Smoking Status Postmenopausal Never Smoker BMI and BSA Data Body Mass Index Body Surface Area  
 23.72 kg/m 2 1.84 m 2 Preferred Pharmacy Pharmacy Name Phone [address] [facility], [place] [address] [phone] Your Updated Medication List  
  
   
 This list is accurate as of: 8/16/17  9:58 AM.  Always use your most recent med list.  
  
  
  
  
 sertraline 25 mg tablet Commonly known as:  ZOLOFT Take 1 Tab by mouth daily. Prescriptions Sent to Pharmacy Refills  
 sertraline (ZOLOFT) 25 mg tablet 1 Sig: Take 1 Tab by mouth daily. Class: Normal  
 Pharmacy: [address] [place] [address] #: [phone] Route: Oral  
  
Follow-up Instructions Return in about 4 weeks (around 9/13/2017). To-Do List   
 09/05/2017 8:30 AM  
  Appointment with [facility]Z 3 at [address] [place] [address] ([phone]) Shower or bathe using soap and water. Do not use deodorant, powder, perfumes, or lotion the day of your exam.  If your prior mammograms were not performed at [facility] 6 please bring films with you or forward prior images 2 days before your procedure. Check in at registration 15min before your appointment time unless you were instructed to do otherwise. A script is not necessary, but if you have one, please bring it on the day of the mammogram or have it faxed to the department. [facility] [phone] [facility]  [phone] [address]  [phone] Critical access hospital [phone] [place] [phone] \[facility]\"" & [facility] SERVICES! Dear Rodrick Ip: 
Thank you for requesting a Nivela account. Our records indicate that you already have an active Nivela account. You can access your account anytime at [email or web address] Did you know that you can access your hospital and ER discharge instructions at any time in Nivela? You can also review all of your test results from your hospital stay or ER visit. Additional Information If you have questions, please visit the Frequently Asked Questions section of the Nivela website at [email or web address] Remember, Nivela is NOT to be used for urgent needs. For medical emergencies, dial 911. Now available from your iPhone and Android! Please provide this summary of care documentation to your next provider. Your primary care clinician is listed as Andreas Wilson. If you have any questions after today's visit, please call [phone].

## 2017-10-12 RX ORDER — SERTRALINE HYDROCHLORIDE 25 MG/1
25 TABLET, FILM COATED ORAL DAILY
Qty: 30 TAB | Refills: 0 | Status: SHIPPED | OUTPATIENT
Start: 2017-10-12 | End: 2017-11-09 | Stop reason: SDUPTHER

## 2017-11-09 RX ORDER — SERTRALINE HYDROCHLORIDE 25 MG/1
25 TABLET, FILM COATED ORAL DAILY
Qty: 30 TAB | Refills: 1 | Status: SHIPPED | OUTPATIENT
Start: 2017-11-09 | End: 2018-01-11 | Stop reason: SDUPTHER

## 2018-01-15 RX ORDER — SERTRALINE HYDROCHLORIDE 25 MG/1
25 TABLET, FILM COATED ORAL DAILY
Qty: 30 TAB | Refills: 0 | Status: SHIPPED | OUTPATIENT
Start: 2018-01-15 | End: 2018-01-26 | Stop reason: SDUPTHER

## 2018-01-26 RX ORDER — SERTRALINE HYDROCHLORIDE 25 MG/1
25 TABLET, FILM COATED ORAL DAILY
Qty: 30 TAB | Refills: 0 | Status: SHIPPED | OUTPATIENT
Start: 2018-01-26 | End: 2018-02-21 | Stop reason: SDUPTHER

## 2018-02-12 RX ORDER — SERTRALINE HYDROCHLORIDE 25 MG/1
25 TABLET, FILM COATED ORAL DAILY
Qty: 30 TAB | Refills: 0 | OUTPATIENT
Start: 2018-02-12

## 2018-02-12 NOTE — TELEPHONE ENCOUNTER
Pt called and said she only has like 3 zoloft left and wants a RF. .. [place] she is going out of town on Wednesday.

## 2018-02-21 ENCOUNTER — OFFICE VISIT (OUTPATIENT)
Dept: BEHAVIORAL/MENTAL HEALTH CLINIC | Age: 58
End: 2018-02-21

## 2018-02-21 VITALS
BODY MASS INDEX: 24.71 KG/M2 | SYSTOLIC BLOOD PRESSURE: 122 MMHG | HEIGHT: 68 IN | DIASTOLIC BLOOD PRESSURE: 72 MMHG | WEIGHT: 163 LBS | HEART RATE: 65 BPM

## 2018-02-21 DIAGNOSIS — F43.10 POST TRAUMATIC STRESS DISORDER (PTSD): ICD-10-CM

## 2018-02-21 DIAGNOSIS — F32.5 MAJOR DEPRESSION IN REMISSION (HCC): Primary | ICD-10-CM

## 2018-02-21 RX ORDER — SERTRALINE HYDROCHLORIDE 25 MG/1
25 TABLET, FILM COATED ORAL DAILY
Qty: 90 TAB | Refills: 1 | Status: SHIPPED | OUTPATIENT
Start: 2018-02-21 | End: 2018-08-21 | Stop reason: CLARIF

## 2018-02-21 NOTE — PROGRESS NOTES
CHIEF COMPLAINT:  Myriam Wilson is a 59 y.o. female and was seen today for follow-up of psychiatric condition and psychotropic medication management. HPI:    Demetria Pena reports the following psychiatric symptoms:  depression and anxiety. The symptoms have been stable for several months and are of low/moderate severity. The symptoms occur intermittently but are less frequent. Pt reports benefit from current medication. Pt here today to review current treatment plan. FAMILY/SOCIAL HX: pt recently     REVIEW OF SYSTEMS:  Psychiatric:  depression, anxiety by hx  Appetite:good   Sleep: good   Neuro: denies    Visit Vitals    /72    Pulse 65    Ht 5' 8\" (1.727 m)    Wt 73.9 kg (163 lb)    LMP 12/25/2013    BMI 24.78 kg/m2       Side Effects:  none, weight gain ? MENTAL STATUS EXAM:   Sensorium  oriented to time, place and person   Relations cooperative   Appearance:  age appropriate and casually dressed   Motor Behavior:  gait stable and within normal limits   Speech:  normal volume   Thought Process: goal directed   Thought Content free of delusions and free of hallucinations   Suicidal ideations no intention   Homicidal ideations no intention   Mood:  euthymic   Affect:  euthymic   Memory recent  adequate   Memory remote:  adequate   Concentration:  adequate   Abstraction:  abstract   Insight:  good   Reliability good   Judgment:  good     MEDICAL DECISION MAKING:  Problems addressed today:    ICD-10-CM ICD-9-CM    1. [facility] F32.5 296.25    2. Post traumatic stress disorder (PTSD) F43.10 309.81        Assessment:   Demetria Pena is responding to treatment, symptoms are improving. Reviewed current dose of SSRI and no changes required. She reports episodes of intermittent depression/anxiety but thinks this is secondary to hx of trauma vs untreated depression and anxiety. Pt discussed changes and updates regarding trauma experience.  Provided support and worked with pt to process information. Plan:   1. Current Outpatient Prescriptions   Medication Sig Dispense Refill    sertraline (ZOLOFT) 25 mg tablet Take 1 Tab by mouth daily. 90 Tab 1          medication changes made today: zoloft 25 mg    2. Counseling and coordination of care including instructions for treatment, risks/benefits, risk factor reduction and patient/family education. She agrees with the plan. Patient instructed to call with any side effects, questions or issues. 3.  Follow-up Disposition:  Return in about 6 months (around 8/21/2018).      Pt seen for 25/30 minutes face to face and greater than 50% time spent was in counseling and coordination of care    2/21/2018  Lili Starkey, NP

## 2018-02-21 NOTE — MR AVS SNAPSHOT
[address] [facility] 
[phone] Patient: Shiraz Samano Abromaitis MRN:  [id number] Visit Information Date & Time Provider Department Dept. Phone Encounter #  
 2/21/2018  2:00 PM Ilana Shoemaker NP [address] [facility] [phone] [id number] Follow-up Instructions Return in about 6 months (around 8/21/2018). Your Appointments 2/21/2018  2:00 PM  
ESTABLISHED PATIENT with Ilana Shoemaker NP  
[facility] [facility]) Appt Note: moved from 1/29 per pt req; 2/16 lm2con [address] [facility]  
[phone]  
  
   
 [address] [facility] [address] [address]  
  
    
 8/13/2018 10:00 AM  
PHYSICAL with Tona Luque MD  
[facility]) Appt Note: cpe no pap  
 [address] [facility] [address] [place] [address] Upcoming Health Maintenance Date Due Hepatitis C Screening 1960 FOBT Q 1 YEAR AGE 50-75 5/22/2008 Influenza Age 5 to Adult 8/1/2017 BREAST CANCER SCRN MAMMOGRAM 6/24/2018 PAP AKA CERVICAL CYTOLOGY 6/24/2019 DTaP/Tdap/Td series (2 - Td) 10/22/2023 Allergies as of 2/21/2018  Review Complete On: 2/21/2018 By: Ilana Shoemaker NP Severity Noted Reaction Type Reactions Other Medication  08/25/2016    Nausea and Vomiting Steroids Current Immunizations  Reviewed on 10/22/2013 Name Date Influenza Vaccine 10/8/2013 Tdap 10/22/2013 Not reviewed this visit You Were Diagnosed With   
  
 Codes Comments Major depression in remission [facility])    -  Primary ICD-10-CM: F32.5 ICD-9-CM: 296.25 Post traumatic stress disorder (PTSD)     ICD-10-CM: F43.10 ICD-9-CM: 309.81 Vitals BP Pulse Height(growth percentile) Weight(growth percentile) LMP BMI  
 122/72 65 5' 8\" (1.727 m) 163 lb (73.9 kg) 12/25/2013 24.78 kg/m2 OB Status Smoking Status Postmenopausal Never Smoker BMI and BSA Data Body Mass Index Body Surface Area 24.78 kg/m 2 1.88 m 2 Preferred Pharmacy Pharmacy Name Phone [address] [phone] Your Updated Medication List  
  
   
This list is accurate as of 2/21/18  1:39 PM.  Always use your most recent med list.  
  
  
  
  
 sertraline 25 mg tablet Commonly known as:  ZOLOFT Take 1 Tab by mouth daily. Prescriptions Sent to Pharmacy Refills  
 sertraline (ZOLOFT) 25 mg tablet 1 Sig: Take 1 Tab by mouth daily. Class: Normal  
 Pharmacy: [address] [address] #: [phone] Route: Oral  
  
Follow-up Instructions Return in about 6 months (around 8/21/2018). Introducing [facility] SERVICES! Dear Dago Ramires: 
Thank you for requesting a AccountNow account. Our records indicate that you already have an active AccountNow account. You can access your account anytime at [email or web address] Did you know that you can access your hospital and ER discharge instructions at any time in AccountNow? You can also review all of your test results from your hospital stay or ER visit. Additional Information If you have questions, please visit the Frequently Asked Questions section of the AccountNow website at [email or web address] Remember, AccountNow is NOT to be used for urgent needs. For medical emergencies, dial 911. Now available from your iPhone and Android! Please provide this summary of care documentation to your next provider. Your primary care clinician is listed as Brittnee Solares.  If you have any questions after today's visit, please call [phone].

## 2018-08-21 ENCOUNTER — OFFICE VISIT (OUTPATIENT)
Dept: BEHAVIORAL/MENTAL HEALTH CLINIC | Age: 58
End: 2018-08-21

## 2018-08-21 VITALS
WEIGHT: 170 LBS | HEIGHT: 68 IN | DIASTOLIC BLOOD PRESSURE: 73 MMHG | HEART RATE: 72 BPM | BODY MASS INDEX: 25.76 KG/M2 | SYSTOLIC BLOOD PRESSURE: 108 MMHG

## 2018-08-21 DIAGNOSIS — F32.5 MAJOR DEPRESSION IN REMISSION (HCC): Primary | ICD-10-CM

## 2018-08-21 DIAGNOSIS — F43.10 POST TRAUMATIC STRESS DISORDER (PTSD): ICD-10-CM

## 2018-08-21 RX ORDER — ESCITALOPRAM OXALATE 5 MG/1
5 TABLET ORAL DAILY
Qty: 30 TAB | Refills: 3 | Status: SHIPPED | OUTPATIENT
Start: 2018-08-21 | End: 2018-11-19

## 2018-08-21 NOTE — PROGRESS NOTES
CHIEF COMPLAINT:  Pilo Diaz is a 59 y.o. female and was seen today for follow-up of psychiatric condition and psychotropic medication management. HPI:    Jesi Mclaughlin reports the following psychiatric symptoms:  depression and anxiety. The symptoms have been present for months and are of low severity. The symptoms occur less frequently and less severely. REVIEW OF SYSTEMS:  Psychiatric:  depression, anxiety  Appetite:decreased and good, has had increased weight gain and appetite, thinks zoloft has contributed to this  Sleep: fitful at times    Side Effects:  weight gain    MENTAL STATUS EXAM:   Sensorium  oriented to time, place and person   Relations cooperative   Appearance:  age appropriate and casually dressed   Motor Behavior:  gait stable and within normal limits   Speech:  normal volume   Thought Process: goal directed   Thought Content free of delusions and free of hallucinations   Suicidal ideations no intention   Homicidal ideations no intention   Mood:  anxious and sad at times, wnl overall   Affect:  anxious, mood-congruent, sad and smiling at times   Memory recent  adequate   Memory remote:  adequate   Concentration:  adequate   Abstraction:  abstract   Insight:  good   Reliability good   Judgment:  good         MEDICAL DECISION MAKING:  Problems addressed today:    ICD-10-CM ICD-9-CM    1. [facility] depression [facility] ([facility] F32.5 296.25    2. Post traumatic stress disorder (PTSD) F43.10 309.81        Assessment:   Jesi Mclaughlin is responding to treatment. Symptoms are stable overall even with recent stressors. Pt has had trouble with weight gain and thinks it started after getting back on an antidepressant. She notes benefit from use of an SSRI and wants to continue with use. Reviewed past trials and explored options. Will switch from zoloft (low dose) to lexapro (low dose). Discussed medication switch and symptoms to monitor for.      Plan:   1.         medication changes: switch zoloft 25 mg to lexapro 5 mg    2. Counseling and coordination of care including instructions for treatment, risks/benefits, risk factor reduction and patient/family education. She agrees with the plan. Patient instructed to call with any side effects, questions or issues. 3.  Follow-up Disposition:  Return in about 3 months (around 11/21/2018). PSYCHOTHERAPY:  approx 20-25 minutes  Type:  Supportive, cognitive  Focus:  Relationship with her mother prior to her death, grief, self care  Esha Akins is progressing. 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 Pt aware of need for self care and has started exercising again. Discussed her goals for her future. Reinforced healthy perspective and importance of working through cognitions r/t guilt.      Barby Wolf NP  8/21/2018

## 2018-08-21 NOTE — MR AVS SNAPSHOT
[address] [facility] [address] 
[phone] Patient: Hanna Jackson MRN:  [id number] Visit Information Date & Time Provider Department Dept. Phone Encounter #  
 8/21/2018 10:00 AM Violet Bower NP [address] [facility] [phone] [id number] Follow-up Instructions Return in about 3 months (around 11/21/2018). Follow-up and Disposition History Your Appointments 11/19/2018 11:00 AM  
ESTABLISHED PATIENT with Violet Bower NP  
[address] [facility] [address]) Appt Note: [address] [address]  
[facility] [email or web address] Upcoming Health Maintenance Date Due Hepatitis C Screening 1960 FOBT Q 1 YEAR AGE 50-75 5/22/2008 ZOSTER VACCINE AGE 60> 3/22/2018 BREAST CANCER SCRN MAMMOGRAM 6/24/2018 Influenza Age 5 to Adult 8/1/2018 PAP AKA CERVICAL CYTOLOGY 6/24/2019 DTaP/Tdap/Td series (2 - Td) 10/22/2023 Allergies as of 8/21/2018  Review Complete On: 8/21/2018 By: Violet Bower NP Severity Noted Reaction Type Reactions Other Medication  08/25/2016    Nausea and Vomiting Steroids Current Immunizations  Reviewed on 10/22/2013 Name Date Influenza Vaccine 10/8/2013 Tdap 10/22/2013 Not reviewed this visit You Were Diagnosed With   
  
 Codes Comments Major depression in remission [facility])    -  Primary ICD-10-CM: F32.5 ICD-9-CM: 296.25 Post traumatic stress disorder (PTSD)     ICD-10-CM: F43.10 ICD-9-CM: 309.81 Vitals BP Pulse Height(growth percentile) Weight(growth percentile) LMP BMI  
 108/73 72 5' 8\" (1.727 m) 170 lb (77.1 kg) 12/25/2013 25.85 kg/m2 OB Status Smoking Status Postmenopausal Never Smoker BMI and BSA Data Body Mass Index Body Surface Area  
 25.85 kg/m 2 1.92 m 2 Preferred Pharmacy Pharmacy Name Phone [facility] [phone] Your Updated Medication List  
  
   
This list is accurate as of 8/21/18 11:27 AM.  Always use your most recent med list.  
  
  
  
  
 escitalopram oxalate 5 mg tablet Commonly known as:  Marnie Rosalesn Take 1 Tab by mouth daily. Prescriptions Sent to Pharmacy Refills  
 escitalopram oxalate (LEXAPRO) 5 mg tablet 3 Sig: Take 1 Tab by mouth daily. Class: Normal  
 Pharmacy: [address], [address]  #: [phone] Route: Oral  
  
Follow-up Instructions Return in about 3 months (around 11/21/2018). Introducing [facility]! Dear Marge: 
Thank you for requesting a OptTown account. Our records indicate that you already have an active OptTown account. You can access your account anytime at [email or web address] Did you know that you can access your hospital and ER discharge instructions at any time in OptTown? You can also review all of your test results from your hospital stay or ER visit. Additional Information If you have questions, please visit the Frequently Asked Questions section of the OptTown website at [email or web address] Remember, OptTown is NOT to be used for urgent needs. For medical emergencies, dial 911. Now available from your iPhone and Android! Please provide this summary of care documentation to your next provider. Your primary care clinician is listed as Kalyan Titus. If you have any questions after today's visit, please call [phone].

## 2018-11-19 ENCOUNTER — OFFICE VISIT (OUTPATIENT)
Dept: BEHAVIORAL/MENTAL HEALTH CLINIC | Age: 58
End: 2018-11-19

## 2018-11-19 VITALS
DIASTOLIC BLOOD PRESSURE: 65 MMHG | WEIGHT: 173 LBS | BODY MASS INDEX: 26.22 KG/M2 | SYSTOLIC BLOOD PRESSURE: 116 MMHG | HEART RATE: 62 BPM | HEIGHT: 68 IN

## 2018-11-19 DIAGNOSIS — F43.10 POST TRAUMATIC STRESS DISORDER (PTSD): ICD-10-CM

## 2018-11-19 DIAGNOSIS — F32.5 MAJOR DEPRESSION IN REMISSION (HCC): Primary | ICD-10-CM

## 2018-11-19 RX ORDER — SERTRALINE HYDROCHLORIDE 25 MG/1
TABLET, FILM COATED ORAL DAILY
COMMUNITY
End: 2018-11-19 | Stop reason: SDUPTHER

## 2018-11-19 RX ORDER — SERTRALINE HYDROCHLORIDE 25 MG/1
25 TABLET, FILM COATED ORAL DAILY
Qty: 30 TAB | Refills: 1 | Status: SHIPPED | OUTPATIENT
Start: 2018-11-19 | End: 2018-12-12 | Stop reason: CLARIF

## 2018-11-19 NOTE — PROGRESS NOTES
CHIEF COMPLAINT:  Hanh Lima is a 59 y.o. female and was seen today for follow-up of psychiatric condition and psychotropic medication management. HPI:    Marge reports the following psychiatric symptoms:  depression and anxiety. The symptoms have been present for several weeks and are of moderate severity. The symptoms occur somewhat more severely and frequently. Pt has had some problems tolerating antidepressants. She also has had some increased medical issues. She is here today to review current treatment plan. FAMILY/SOCIAL HX: no new stressors reported    REVIEW OF SYSTEMS:  Psychiatric:  depression, anxiety  Appetite:increased   Sleep: poor with DIMS (difficulty initiating & maintaining sleep)   Neuro: chronic pain    Visit Vitals  /65   Pulse 62   Ht 5' 8\" (1.727 m)   Wt 78.5 kg (173 lb)   LMP 12/25/2013   BMI 26.30 kg/m²       Side Effects:  weight gain    MENTAL STATUS EXAM:   Sensorium  oriented to time, place and person   Relations cooperative   Appearance:  age appropriate and casually dressed   Motor Behavior:  gait stable and within normal limits   Speech:  normal volume   Thought Process: goal directed   Thought Content free of delusions and free of hallucinations   Suicidal ideations no intention   Homicidal ideations no intention   Mood:  anxious and depressed/sad   Affect:  anxious and sad   Memory recent  adequate   Memory remote:  adequate   Concentration:  adequate   Abstraction:  abstract   Insight:  good   Reliability good   Judgment:  good     MEDICAL DECISION MAKING:  Problems addressed today:    ICD-10-CM ICD-9-CM    1. [facility] F32.5 296.25    2. Post traumatic stress disorder (PTSD) F43.10 309.81        Assessment:   Marge is not responding to treatment. She reports she was not able to tolerate lexapro and so she stopped taking it.  She has re-started zoloft but finds she gains weight easily on it and states she has increased sugar cravings with it. Reviewed options including pharmacogenomic testing. Pt would like to have test completed due to past problems with medications. Discussed tx goals and lifestyle factors and if pt is better able to tolerate an antidepressant without these SE's that would be a health improvement. Discussed impact of stress and trauma on life. Discussed trajectory of past hx of trauma. Provided support and encouragement. Reviewed and reinforced health strategies she has continued to practice. Plan:   1. Current Outpatient Medications   Medication Sig Dispense Refill    sertraline (ZOLOFT) 25 mg tablet Take 1 Tab by mouth daily. 30 Tab 1          medication changes made today: dc lexapr, cont zoloft 25 mg    2. Counseling and coordination of care including instructions for treatment, risks/benefits, risk factor reduction and patient/family education. She agrees with the plan. Patient instructed to call with any side effects, questions or issues. 3.  Follow-up Disposition:  Return in about 2 weeks (around 12/3/2018).      4. Pharmacogenomic testing    11/19/2018  Nona Huff NP

## 2018-12-12 ENCOUNTER — OFFICE VISIT (OUTPATIENT)
Dept: BEHAVIORAL/MENTAL HEALTH CLINIC | Age: 58
End: 2018-12-12

## 2018-12-12 VITALS
DIASTOLIC BLOOD PRESSURE: 64 MMHG | HEIGHT: 68 IN | BODY MASS INDEX: 26.22 KG/M2 | HEART RATE: 73 BPM | WEIGHT: 173 LBS | SYSTOLIC BLOOD PRESSURE: 107 MMHG

## 2018-12-12 DIAGNOSIS — F32.5 MAJOR DEPRESSION IN REMISSION (HCC): Primary | ICD-10-CM

## 2018-12-12 DIAGNOSIS — F43.10 POST TRAUMATIC STRESS DISORDER (PTSD): ICD-10-CM

## 2018-12-12 RX ORDER — DESVENLAFAXINE 25 MG/1
25 TABLET, EXTENDED RELEASE ORAL DAILY
Qty: 30 TAB | Refills: 1 | Status: SHIPPED | OUTPATIENT
Start: 2018-12-12 | End: 2020-03-31 | Stop reason: SDUPTHER

## 2018-12-12 NOTE — PROGRESS NOTES
CHIEF COMPLAINT:  Bree Correa is a 59 y.o. female and was seen today for follow-up of psychiatric condition and psychotropic medication management. HPI:    Kristin Agee reports the following psychiatric symptoms:  depression and anxiety. Pt reports she has had been experiencing increased depression/sadness. She thinks it is mostly grief related. She has benefited from current medications but is interested in learning about results from pharmacogenomic results. Pt here today to review current treatment plan and for therapy. REVIEW OF SYSTEMS:  Psychiatric:  depression, anxiety  Appetite:good, increased sweets at times   Sleep: poor with DMS (maintaining sleep)     Side Effects:  none    MENTAL STATUS EXAM:   Sensorium  oriented to time, place and person   Relations cooperative   Appearance:  age appropriate and casually dressed   Motor Behavior:  gait stable and within normal limits   Speech:  normal volume   Thought Process: goal directed   Thought Content free of delusions and free of hallucinations   Suicidal ideations no intention   Homicidal ideations no intention   Mood:  anxious and sad   Affect:  anxious and sad   Memory recent  adequate   Memory remote:  adequate   Concentration:  adequate   Abstraction:  abstract   Insight:  good   Reliability good   Judgment:  good         MEDICAL DECISION MAKING:  Problems addressed today:    ICD-10-CM ICD-9-CM    1. [facility] depression [facility] ([facility] F32.5 296.25    2. Post traumatic stress disorder (PTSD) F43.10 309.81        Assessment:   Kristin Agee is responding to treatment somewhat. Pt not able to tolerate higher dosages of zoloft so have not been able to maximize response. Reviewed pharmacogenomic results and zoloft on pt's limited benefit profile. Discussed options and will switch to pristiq. Discussed start of a new medication. Provided support and answered questions. Plan:   1.     Current Outpatient Medications   Medication Sig Dispense Refill    desvenlafaxine succinate (PRISTIQ) 25 mg ER tablet Take 1 Tab by mouth daily. 30 Tab 1          medication changes: start pristiq 25 mg in 2 weeks, cont low dose zoloft until then and then dc    2. Counseling and coordination of care including instructions for treatment, risks/benefits, risk factor reduction and patient/family education. She agrees with the plan. Patient instructed to call with any side effects, questions or issues. 3.  Follow-up Disposition: Not on File    PSYCHOTHERAPY:  approx 20 minutes  Type:  Cognitive, supportive, grief  Focus:  experience of pain, impact of grief and trauma    Pieter Almanza is progressing. She remains active in treatment and works to building health. Pt insightful and engaged.      Janet Angulo, NP  12/12/2018

## 2019-05-13 ENCOUNTER — OFFICE VISIT (OUTPATIENT)
Dept: URGENT CARE | Age: 59
End: 2019-05-13

## 2019-05-13 VITALS
BODY MASS INDEX: 26.22 KG/M2 | WEIGHT: 173 LBS | OXYGEN SATURATION: 98 % | RESPIRATION RATE: 18 BRPM | SYSTOLIC BLOOD PRESSURE: 119 MMHG | HEIGHT: 68 IN | DIASTOLIC BLOOD PRESSURE: 60 MMHG | TEMPERATURE: 97.2 F | HEART RATE: 73 BPM

## 2019-05-13 DIAGNOSIS — J02.9 SORE THROAT: ICD-10-CM

## 2019-05-13 DIAGNOSIS — J02.0 ACUTE STREPTOCOCCAL PHARYNGITIS: Primary | ICD-10-CM

## 2019-05-13 LAB
S PYO AG THROAT QL: POSITIVE
VALID INTERNAL CONTROL?: YES

## 2019-05-13 RX ORDER — AMOXICILLIN 400 MG/5ML
7 POWDER, FOR SUSPENSION ORAL 2 TIMES DAILY
Qty: 140 ML | Refills: 0 | Status: SHIPPED | OUTPATIENT
Start: 2019-05-13 | End: 2019-05-23

## 2019-05-13 NOTE — PATIENT INSTRUCTIONS
Follow up with PCP without improvement over next 3-4 days sooner/immediately for new or worsening       Strep Throat: Care Instructions  Your Care Instructions    Strep throat is a bacterial infection that causes sudden, severe sore throat and fever. Strep throat, which is caused by bacteria called streptococcus, is treated with antibiotics. Sometimes a strep test is necessary to tell if the sore throat is caused by strep bacteria. Treatment can help ease symptoms and may prevent future problems. Follow-up care is a key part of your treatment and safety. Be sure to make and go to all appointments, and call your doctor if you are having problems. It's also a good idea to know your test results and keep a list of the medicines you take. How can you care for yourself at home? · Take your antibiotics as directed. Do not stop taking them just because you feel better. You need to take the full course of antibiotics. · Strep throat can spread to others until 24 hours after you begin taking antibiotics. During this time, you should avoid contact with other people at work or home, especially infants and children. Do not sneeze or cough on others, and wash your hands often. Keep your drinking glass and eating utensils separate from those of others, and wash these items well in hot, soapy water. · Gargle with warm salt water at least once each hour to help reduce swelling and make your throat feel better. Use 1 teaspoon of salt mixed in 8 fluid ounces of warm water. · Take an over-the-counter pain medication, such as acetaminophen (Tylenol), ibuprofen (Advil, Motrin), or naproxen (Aleve). Read and follow all instructions on the label. · Try an over-the-counter anesthetic throat spray or throat lozenges, which may help relieve throat pain. · Drink plenty of fluids. Fluids may help soothe an irritated throat. Hot fluids, such as tea or soup, may help your throat feel better.   · Eat soft solids and drink plenty of clear liquids. Flavored ice pops, ice cream, scrambled eggs, sherbet, and gelatin dessert (such as Jell-O) may also soothe the throat. · Get lots of rest.  · Do not smoke, and avoid secondhand smoke. If you need help quitting, talk to your doctor about stop-smoking programs and medicines. These can increase your chances of quitting for good. · Use a vaporizer or humidifier to add moisture to the air in your bedroom. Follow the directions for cleaning the machine. When should you call for help? Call your doctor now or seek immediate medical care if:    · You have a new or higher fever.     · You have a fever with a stiff neck or severe headache.     · You have new or worse trouble swallowing.     · Your sore throat gets much worse on one side.     · Your pain becomes much worse on one side of your throat.    Watch closely for changes in your health, and be sure to contact your doctor if:    · You are not getting better after 2 days (48 hours).     · You do not get better as expected. Where can you learn more? Go to http://bubba-garima.info/. Enter K625 in the search box to learn more about \"Strep Throat: Care Instructions. \"  Current as of: March 27, 2018  Content Version: 11.9  © 4430-7593 [employer]. Care instructions adapted under license by [employer] (which disclaims liability or warranty for this information). If you have questions about a medical condition or this instruction, always ask your healthcare professional. [place] [address] any warranty or liability for your use of this information.

## 2019-05-13 NOTE — PROGRESS NOTES
Sore throat x 2 days  6/10 worse with swallowing  Is tolerating food and fluids  No rash, fever or severe headache  Hasnt tried therapies  Overall worsening. No cough  Has a past medical history of Depression, FH: diabetes mellitus, Fibromyalgia (), Hypercholesterolemia, and Stroke [facility]) (245,, ).            Past Medical History:   Diagnosis Date    Depression     FH: diabetes mellitus     Fibromyalgia     Hypercholesterolemia     Stroke [facility]) 3839,,     right sided        Past Surgical History:   Procedure Laterality Date    HX  SECTION      HX TONSILLECTOMY           Family History   Problem Relation Age of Onset    Diabetes Mother     Diabetes Father     Cancer Father     Lung Disease Sister         pulmonary fibrosis, on O2    Diabetes Brother         Social History     Socioeconomic History    Marital status:      Spouse name: Not on file    Number of children: Not on file    Years of education: Not on file    Highest education level: Not on file   Occupational History    Not on file   Social Needs    Financial resource strain: Not on file    Food insecurity:     Worry: Not on file     Inability: Not on file    Transportation needs:     Medical: Not on file     Non-medical: Not on file   Tobacco Use    Smoking status: Never Smoker    Smokeless tobacco: Never Used   Substance and Sexual Activity    Alcohol use: No     Alcohol/week: 0.0 oz    Drug use: No    Sexual activity: Never     Partners: Male   Lifestyle    Physical activity:     Days per week: Not on file     Minutes per session: Not on file    Stress: Not on file   Relationships    Social connections:     Talks on phone: Not on file     Gets together: Not on file     Attends Taoist service: Not on file     Active member of club or organization: Not on file     Attends meetings of clubs or organizations: Not on file     Relationship status: Not on file    Intimate partner violence: Fear of current or ex partner: Not on file     Emotionally abused: Not on file     Physically abused: Not on file     Forced sexual activity: Not on file   Other Topics Concern    Not on file   Social History Narrative    Not on file                ALLERGIES: Other medication    Review of Systems   All other systems reviewed and are negative. Vitals:    05/13/19 1306   BP: 119/60   Pulse: 73   Resp: 18   Temp: 97.2 °F (36.2 °C)   SpO2: 98%   Weight: 173 lb (78.5 kg)   Height: 5' 8\" (1.727 m)       Physical Exam   Constitutional: She is oriented to person, place, and time. No distress. Non-toxic appearing, well hydrated   HENT:   OP erythmea without swelling,exudate or lesion. TMs normal. Uvula midline. Nasal passages normal.    Eyes: Pupils are equal, round, and reactive to light. EOM are normal. No scleral icterus. Neck: Normal range of motion. Neck supple. Cardiovascular: Normal rate, regular rhythm and normal heart sounds. Exam reveals no gallop and no friction rub. No murmur heard. Pulmonary/Chest: Effort normal and breath sounds normal. No respiratory distress. She has no wheezes. She has no rales. Lymphadenopathy:     She has cervical adenopathy. Neurological: She is alert and oriented to person, place, and time. No cranial nerve deficit. Skin: Skin is warm and dry. No rash noted. She is not diaphoretic. No erythema. No pallor. Psychiatric: She has a normal mood and affect. Her behavior is normal. Thought content normal.   Nursing note and vitals reviewed. MDM     Differential Diagnosis; Clinical Impression; Plan:       CLINICAL IMPRESSION:  (J02.0) Acute streptococcal pharyngitis  (primary encounter diagnosis)  (J02.9) Sore throat    Orders Placed This Encounter      amoxicillin (AMOXIL) 400 mg/5 mL suspension          Sig: Take 7 mL by mouth two (2) times a day for 10 days.           Dispense:  140 mL          Refill:  0      Plan:    Rapid strep test result: POSITIVE  Please take antibiotic as prescribed  until completion. OTC Tylenol or Advil as needed for fever/throat pain/discomfort. Maintain adequate fluid intake. May try gargles or throat lozenges. Throw out toothbrush after being on antibiotic for 48 hours    Follow up in in clinic without some relief in symptoms after being on antibiotic for 48 hours. Follow up immediately for new or worsening symptoms. We have reviewed concerning signs/symptoms, normal vs abnormal progression of medical condition and when to seek immediate medical attention. Schedule with PCP or Urgent Care immediately for worsening or new symptoms.             Procedures

## 2020-03-31 ENCOUNTER — VIRTUAL VISIT (OUTPATIENT)
Dept: BEHAVIORAL/MENTAL HEALTH CLINIC | Age: 60
End: 2020-03-31

## 2020-03-31 DIAGNOSIS — F32.5 MAJOR DEPRESSION IN REMISSION (HCC): Primary | ICD-10-CM

## 2020-03-31 DIAGNOSIS — F43.10 POST TRAUMATIC STRESS DISORDER (PTSD): ICD-10-CM

## 2020-03-31 RX ORDER — DESVENLAFAXINE 25 MG/1
25 TABLET, EXTENDED RELEASE ORAL DAILY
Qty: 30 TAB | Refills: 1 | Status: SHIPPED | OUTPATIENT
Start: 2020-03-31 | End: 2020-05-07 | Stop reason: SDUPTHER

## 2020-03-31 NOTE — PROGRESS NOTES
Anabella Crawley is a 62 y.o. female evaluated via telephone on 3/31/2020. Consent:  She and/or health care decision maker is aware that that she may receive a bill for this telephone service, depending on her insurance coverage, and has provided verbal consent to proceed: Yes      Documentation:  I communicated with the patient and/or health care decision maker about impact of PTSD and restarting medication. Details of this discussion including any medical advice provided: reviewed impact of hx of ACE's and lifetime management of PTSD. Discussed strategies for self care, safe boundaries and marriage boundaries. Reviewed restarting pristiq and possible SE's. Will wqork to monitor for any possible kidney SE's. Pt reports she reviewed it with her medical doctors as well. I affirm this is a Patient Initiated Episode with an Established Patient who has not had a related appointment within my department in the past 7 days or scheduled within the next 24 hours.     Total Time: minutes: 21-30 minutes    Note: not billable if this call serves to triage the patient into an appointment for the relevant concern      Jameel Shaw NP

## 2020-05-07 ENCOUNTER — VIRTUAL VISIT (OUTPATIENT)
Dept: BEHAVIORAL/MENTAL HEALTH CLINIC | Age: 60
End: 2020-05-07

## 2020-05-07 DIAGNOSIS — F43.10 POST TRAUMATIC STRESS DISORDER (PTSD): ICD-10-CM

## 2020-05-07 DIAGNOSIS — F32.5 MAJOR DEPRESSION IN REMISSION (HCC): Primary | ICD-10-CM

## 2020-05-07 RX ORDER — DESVENLAFAXINE SUCCINATE 50 MG/1
50 TABLET, EXTENDED RELEASE ORAL DAILY
Qty: 30 TAB | Refills: 2 | Status: SHIPPED | OUTPATIENT
Start: 2020-05-07 | End: 2020-09-21 | Stop reason: SDUPTHER

## 2020-05-07 NOTE — PROGRESS NOTES
CHIEF COMPLAINT:  Nandini Oliver is a 62 y.o. female and was seen today for follow-up of psychiatric condition and psychotropic medication management. HPI:    Marge reports the following psychiatric symptoms:  depression and anxiety. The symptoms have been present for months and are of moderate/high severity. The symptoms occur more frequently and more severely due to current stressors. Met with pt via video telehealth to review current treatment plan. FAMILY/SOCIAL HX: marital stressors    REVIEW OF SYSTEMS:  Psychiatric:  depression, anxiety  Appetite:no change from normal   Sleep: fitful at times  Neuro: denies    Visit Vitals  [facility] 12/25/2013       Side Effects:  none    MENTAL STATUS EXAM:   Sensorium  oriented to time, place and person   Relations cooperative   Appearance:  age appropriate and casually dressed   Motor Behavior:  gait stable and within normal limits   Speech:  normal volume   Thought Process: goal directed   Thought Content free of delusions and free of hallucinations   Suicidal ideations no intention   Homicidal ideations no intention   Mood:  anxious and depressed   Affect:  anxious and depressed   Memory recent  adequate   Memory remote:  adequate   Concentration:  adequate   Abstraction:  abstract   Insight:  good   Reliability good   Judgment:  good     MEDICAL DECISION MAKING:  Problems addressed today:    ICD-10-CM ICD-9-CM    1. Major depression in remission ([facility] F32.5 296.25    2. Post traumatic stress disorder (PTSD) F43.10 309.81        Assessment:   Marge is responding to treatment somewhat. She states she is sleeping better. Still with significant anxiety and depression though. Will increase pristiq at this time. Pt discussed stressors r/t PTSD reactivation. Reviewed coping and cognitive strategies for ongoing stabilization. Plan:   1.     Current Outpatient Medications   Medication Sig Dispense Refill    desvenlafaxine succinate (PRISTIQ) 50 mg ER tablet Take 1 Tab by mouth daily. 30 Tab 2          medication changes made today: increase pristiq 50 mg    2. Counseling and coordination of care including instructions for treatment, risks/benefits, risk factor reduction and patient/family education. She agrees with the plan. Patient instructed to call with any side effects, questions or issues. 3.    Follow-up and Dispositions    · Return in about 3 months (around 8/7/2020). Aron Tinajero is a 62 y.o. female who was seen by synchronous (real-time) audio-video technology on 5/7/2020. Consent: Aron Tinajero, who was seen by synchronous (real-time) audio-video technology, and/or her healthcare decision maker, is aware that this patient-initiated, Telehealth encounter on 5/7/2020 is a billable service, with coverage as determined by her insurance carrier. She is aware that she may receive a bill and has provided verbal consent to proceed: Yes. We discussed the expected course, resolution and complications of the diagnosis(es) in detail. Medication risks, benefits, costs, interactions, and alternatives were discussed as indicated. I advised her to contact the office if her condition worsens, changes or fails to improve as anticipated. She expressed understanding with the diagnosis(es) and plan. Aron Tinajero is a 62 y.o. female who was evaluated by a video visit encounter for concerns as above. Patient identification was verified prior to start of the visit. A caregiver was present when appropriate. Due to this being a TeleHealth encounter (During [facility]- public health emergency), evaluation of the following organ systems was limited: Vitals/Constitutional/EENT/Resp/CV/GI//MS/Neuro/Skin/Heme-Lymph-Imm.   Pursuant to the emergency declaration under the [facility], [address] [place] [address] authority and the [employer] and [employer] General Act, this Virtual  Visit was conducted, with patient's (and/or legal guardian's) consent, to reduce the patient's risk of exposure to COVID-19 and provide necessary medical care. Services were provided through a video synchronous discussion virtually to substitute for in-person clinic visit. Patient and provider were located at their individual homes.       5/7/2020  Kristen Henderson NP

## 2020-05-26 ENCOUNTER — VIRTUAL VISIT (OUTPATIENT)
Dept: BEHAVIORAL/MENTAL HEALTH CLINIC | Age: 60
End: 2020-05-26

## 2020-05-26 DIAGNOSIS — F43.10 POST TRAUMATIC STRESS DISORDER (PTSD): ICD-10-CM

## 2020-05-26 DIAGNOSIS — F32.5 MAJOR DEPRESSION IN REMISSION (HCC): Primary | ICD-10-CM

## 2020-05-26 NOTE — PROGRESS NOTES
CHIEF COMPLAINT:  Auburn Schirmer is a 56 y.o. female and was seen today for follow-up of psychiatric condition and psychotropic medication management. HPI:    Marge reports the following psychiatric symptoms:  depression and anxiety. The symptoms have been present for months and are of moderate severity. The symptoms occur somewhat less severely with current medication and  support. Pt reports she has noticed a slight benefit from urrent medication. Met with pt via video telehealth for appt today. FAMILY/SOCIAL HX: domestic issues, reviewed [facility] as a resource    REVIEW OF SYSTEMS:  Psychiatric:  depression, anxiety  Appetite:good   Sleep: fitful at times  Neuro: no changes    Visit Vitals  [facility] 12/25/2013       Side Effects:  none    MENTAL STATUS EXAM:   Sensorium  oriented to time, place and person   Relations cooperative   Appearance:  age appropriate and casually dressed   Motor Behavior:  gait stable and within normal limits   Speech:  normal volume   Thought Process: goal directed   Thought Content free of delusions and free of hallucinations   Suicidal ideations no intention   Homicidal ideations no intention   Mood:  anxious and depressed   Affect:  anxious and depressed   Memory recent  adequate   Memory remote:  adequate   Concentration:  adequate   Abstraction:  abstract   Insight:  good   Reliability good   Judgment:  good     MEDICAL DECISION MAKING:  Problems addressed today:    ICD-10-CM ICD-9-CM    1. Major depression in remission ([facility] F32.5 296.25    2. Post traumatic stress disorder (PTSD) F43.10 309.81        Assessment:   Marge is responding to treatment somewhat. Pt shared that she has noticed a slight improvement in mood since starting higher dose of pristiq. Reviewed treatment goals and symptoms to monitor for. Discussed risk of PTSD relapse and importance of healthy boundaries. Discussed support organizations such as [facility].  Will send the information to pt and our list of therapists as well. Provided support and reviewed healthy cognitions r/t self care. Plan:   1. Current Outpatient Medications   Medication Sig Dispense Refill    desvenlafaxine succinate (PRISTIQ) 50 mg ER tablet Take 1 Tab by mouth daily. 30 Tab 2          medication changes made today: cont pristiq 50 mg    2. Counseling and coordination of care including instructions for treatment, risks/benefits, risk factor reduction and patient/family education. She agrees with the plan. Patient instructed to call with any side effects, questions or issues. 3.    Follow-up and Dispositions    · Return in about 2 months (around 7/26/2020). Nandini Oliver is a 56 y.o. female who was seen by synchronous (real-time) audio-video technology on 5/26/2020. Consent: Nandini Oliver, who was seen by synchronous (real-time) audio-video technology, and/or her healthcare decision maker, is aware that this patient-initiated, Telehealth encounter on 5/26/2020 is a billable service, with coverage as determined by her insurance carrier. She is aware that she may receive a bill and has provided verbal consent to proceed: Yes. We discussed the expected course, resolution and complications of the diagnosis(es) in detail. Medication risks, benefits, costs, interactions, and alternatives were discussed as indicated. I advised her to contact the office if her condition worsens, changes or fails to improve as anticipated. She expressed understanding with the diagnosis(es) and plan. Nandini Oliver is a 56 y.o. female who was evaluated by a video visit encounter for concerns as above. Patient identification was verified prior to start of the visit. A caregiver was present when appropriate.  Due to this being a TeleHealth encounter ([facility]), evaluation of the following organ systems was limited: Vitals/Constitutional/EENT/Resp/CV/GI//MS/Neuro/Skin/Heme-Lymph-Imm. Pursuant to the emergency declaration under the [address] [place] [address], [facility] waiver authority and the [employer] and [employer] Act, this Virtual  Visit was conducted, with patient's (and/or legal guardian's) consent, to reduce the patient's risk of exposure to COVID-19 and provide necessary medical care. Services were provided through a video synchronous discussion virtually to substitute for in-person clinic visit. Patient and provider were located at their individual homes.     5/26/2020  Elyssa Dow NP

## 2020-09-21 ENCOUNTER — VIRTUAL VISIT (OUTPATIENT)
Dept: BEHAVIORAL/MENTAL HEALTH CLINIC | Age: 60
End: 2020-09-21
Payer: MEDICARE

## 2020-09-21 DIAGNOSIS — F32.5 MAJOR DEPRESSION IN REMISSION (HCC): Primary | ICD-10-CM

## 2020-09-21 DIAGNOSIS — F43.10 POST TRAUMATIC STRESS DISORDER (PTSD): ICD-10-CM

## 2020-09-21 DIAGNOSIS — F43.21 GRIEF: ICD-10-CM

## 2020-09-21 PROCEDURE — 99214 OFFICE O/P EST MOD 30 MIN: CPT | Performed by: NURSE PRACTITIONER

## 2020-09-21 RX ORDER — DESVENLAFAXINE 100 MG/1
100 TABLET, EXTENDED RELEASE ORAL DAILY
Qty: 30 TAB | Refills: 2 | Status: SHIPPED | OUTPATIENT
Start: 2020-09-21 | End: 2021-12-21 | Stop reason: SDUPTHER

## 2020-09-21 NOTE — PROGRESS NOTES
CHIEF COMPLAINT:  Paula Mott is a 56 y.o. female and was seen today for follow-up of psychiatric condition and psychotropic medication management. HPI:    Marge reports the following psychiatric symptoms:  depression and anxiety. The symptoms have been present for months and are of moderate severity. The symptoms occur most days and are made worse by ongoing grief issues. Met with pt via video telehealth for appt today. FAMILY/SOCIAL HX: marital stressors    REVIEW OF SYSTEMS:  Psychiatric:  depression, anxiety  Appetite:decreased and good, pt reports 15 lb weight loss  Sleep: fitful, impacted by grief and stress  Neuro: no updates    Visit Vitals  [facility] 12/25/2013       Side Effects:  none    MENTAL STATUS EXAM:   Sensorium  Alert and Oriented x 1   Relations cooperative   Appearance:  age appropriate and casually dressed   Motor Behavior:  gait stable and within normal limits   Speech:  normal volume   Thought Process: goal directed   Thought Content free of delusions and free of hallucinations   Suicidal ideations no intention   Homicidal ideations no intention   Mood:  anxious and depressed   Affect:  anxious and depressed   Memory recent  adequate   Memory remote:  adequate   Concentration:  adequate   Abstraction:  abstract   Insight:  good   Reliability good   Judgment:  good     MEDICAL DECISION MAKING:  Problems addressed today:    ICD-10-CM ICD-9-CM    1. Major depression [facility] ([facility]  F32.5 296.25    2. Post traumatic stress disorder (PTSD)  F43.10 309.81    3. Grief  F43.21 309.0        Assessment:   Marge is responding to treatment somewhat. Still with ongoing symptoms of depression and anxiety. Reviewed options and will increase desvenlafaxine to 100 mg. Discussed impact of grief and stress on symptoms. Pt agrees that regular therapy sessions may be of help. Will mail resource list to her. Discussed importance of self care.  Pt states she has been working to take better care of herself due to increased stressors. She is spending time with her daughter and grandkids which is positive. Plan:   1. Current Outpatient Medications   Medication Sig Dispense Refill    desvenlafaxine succinate 100 mg Tb24 Take 1 Tab by mouth daily. 30 Tab 2          medication changes made today: increase pristiq to 100 mg    2. Counseling and coordination of care including instructions for treatment, risks/benefits, risk factor reduction and patient/family education. She agrees with the plan. Patient instructed to call with any side effects, questions or issues. 3.    Follow-up and Dispositions    · Return in about 3 months (around 12/21/2020). Lindsey Burton Dat, who was evaluated through a synchronous (real-time) audio-video encounter, and/or her healthcare decision maker, is aware that it is a billable service, with coverage as determined by her insurance carrier. She provided verbal consent to proceed: Yes, and patient identification was verified. It was conducted pursuant to the emergency declaration under the [address] [place] [address], [address] [place] [address] authority and the [employer] General Act. A caregiver was present when appropriate. Ability to conduct physical exam was limited. I was at home. The patient was at home.       9/21/2020  Elodia Prather NP

## 2021-12-21 ENCOUNTER — VIRTUAL VISIT (OUTPATIENT)
Dept: BEHAVIORAL/MENTAL HEALTH CLINIC | Age: 61
End: 2021-12-21
Payer: MEDICARE

## 2021-12-21 DIAGNOSIS — F43.10 POST TRAUMATIC STRESS DISORDER (PTSD): ICD-10-CM

## 2021-12-21 DIAGNOSIS — F32.5 MAJOR DEPRESSION IN REMISSION (HCC): Primary | ICD-10-CM

## 2021-12-21 DIAGNOSIS — F43.21 GRIEF: ICD-10-CM

## 2021-12-21 PROCEDURE — G9717 DOC PT DX DEP/BP F/U NT REQ: HCPCS | Performed by: NURSE PRACTITIONER

## 2021-12-21 PROCEDURE — G8427 DOCREV CUR MEDS BY ELIG CLIN: HCPCS | Performed by: NURSE PRACTITIONER

## 2021-12-21 PROCEDURE — 99214 OFFICE O/P EST MOD 30 MIN: CPT | Performed by: NURSE PRACTITIONER

## 2021-12-21 PROCEDURE — 3017F COLORECTAL CA SCREEN DOC REV: CPT | Performed by: NURSE PRACTITIONER

## 2021-12-21 RX ORDER — TEMAZEPAM 15 MG/1
15 CAPSULE ORAL
Qty: 30 CAPSULE | Refills: 0 | Status: SHIPPED | OUTPATIENT
Start: 2021-12-21 | End: 2022-01-20

## 2021-12-21 RX ORDER — DESVENLAFAXINE 100 MG/1
100 TABLET, EXTENDED RELEASE ORAL DAILY
Qty: 30 TABLET | Refills: 2 | Status: SHIPPED | OUTPATIENT
Start: 2021-12-21 | End: 2022-03-02 | Stop reason: SDUPTHER

## 2021-12-21 RX ORDER — BUPROPION HYDROCHLORIDE 75 MG/1
75 TABLET ORAL DAILY
Qty: 30 TABLET | Refills: 2 | Status: SHIPPED | OUTPATIENT
Start: 2021-12-21 | End: 2022-03-02

## 2021-12-21 NOTE — PROGRESS NOTES
CHIEF COMPLAINT:  Luke Grigsby is a 59 y.o. female and was seen today for follow-up of psychiatric condition and psychotropic medication management. HPI:    Yesy Joyner reports the following psychiatric symptoms by hx:  depression and anxiety. Overall symptoms have been present for months. Currently depression is of moderate/high severity. The symptoms occur daily. Pt reports anxiety symptoms have remained stable overall. Pt reports medications are beneficial but she wants to reviewed exacerbated symptoms. Met with pt via video telehealth for appt today to review current treatment plan. FAMILY/SOCIAL HX: psychosocial stressors    REVIEW OF SYSTEMS:  Psychiatric symptoms being monitored for:  depression, anxiety  Appetite:good and increased secondary to stressors  Sleep: poor with DIMS (difficulty initiating & maintaining sleep)   Neuro: no updates    Visit Vitals  [facility] 12/25/2013       Side Effects:  none    MENTAL STATUS EXAM:   Sensorium  oriented to time, place and person   Relations cooperative   Appearance:  age appropriate and casually dressed   Motor Behavior:  gait stable and within normal limits   Speech:  normal volume   Thought Process: goal directed   Thought Content free of delusions and free of hallucinations   Suicidal ideations no intention   Homicidal ideations no intention   Mood:  depressed   Affect:  depressed   Memory recent  adequate   Memory remote:  adequate   Concentration:  adequate   Abstraction:  abstract   Insight:  good   Reliability good   Judgment:  good     MEDICAL DECISION MAKING:  Problems addressed today:    ICD-10-CM ICD-9-CM    1. Major depression in remission (HCC)  F32.5 296.25 temazepam (RESTORIL) 15 mg capsule   2. Post traumatic stress disorder (PTSD)  F43.10 309.81    3. Grief  F43.21 309.0        Assessment:   Yesy Joyner is responding to treatment. Symptoms are exacerbated. Discussed current medications and dosages. Will add wellbutrin for depression augmentation. Will also re-start temazepam for sleep. Reviewed benzo safety and prn guidelines. Reviewed treatment goals and target symptoms to monitor for. Reinforced ongoing work pt has been doing for self care and MH. Plan:   1. Current Outpatient Medications   Medication Sig Dispense Refill    buPROPion (WELLBUTRIN) 75 mg tablet Take 1 Tablet by mouth daily. 30 Tablet 2    temazepam (RESTORIL) 15 mg capsule Take 1 Capsule by mouth nightly as needed for Sleep for up to 30 days. 30 Capsule 0    Desvenlafaxine 100 mg Tb24 Take 1 Tablet by mouth daily. 30 Tablet 2          medication changes made today: cont pristiq, add wellbutrin, re-start temazepam    2. Counseling and coordination of care including instructions for treatment, risks/benefits, risk factor reduction and patient/family education. She agrees with the plan. Patient instructed to call with any side effects, questions or issues. 3.    Follow-up and Dispositions    · Return in about 3 months (around 3/21/2022). Samira Alaniz Dat, who was evaluated through a synchronous (real-time) audio-video encounter, and/or her healthcare decision maker, is aware that it is a billable service, with coverage as determined by her insurance carrier. She provided verbal consent to proceed: Yes, and patient identification was verified. This visit was conducted pursuant to the emergency declaration under the [address] [place] [place] [address], [address] [place] [address] authority and the [employer] General Act. A caregiver was present when appropriate. Ability to conduct physical exam was limited. The patient was located in a state where the provider was credentialed to provide care.        12/21/2021  Ashley Ratliff NP

## 2022-03-02 ENCOUNTER — OFFICE VISIT (OUTPATIENT)
Dept: BEHAVIORAL/MENTAL HEALTH CLINIC | Age: 62
End: 2022-03-02
Payer: MEDICARE

## 2022-03-02 VITALS
WEIGHT: 198 LBS | OXYGEN SATURATION: 98 % | HEART RATE: 67 BPM | TEMPERATURE: 98.4 F | HEIGHT: 68 IN | DIASTOLIC BLOOD PRESSURE: 64 MMHG | SYSTOLIC BLOOD PRESSURE: 111 MMHG | RESPIRATION RATE: 17 BRPM | BODY MASS INDEX: 30.01 KG/M2

## 2022-03-02 DIAGNOSIS — F32.5 MAJOR DEPRESSION IN REMISSION (HCC): Primary | ICD-10-CM

## 2022-03-02 DIAGNOSIS — F43.10 POST TRAUMATIC STRESS DISORDER (PTSD): ICD-10-CM

## 2022-03-02 PROCEDURE — G8427 DOCREV CUR MEDS BY ELIG CLIN: HCPCS | Performed by: NURSE PRACTITIONER

## 2022-03-02 PROCEDURE — 3017F COLORECTAL CA SCREEN DOC REV: CPT | Performed by: NURSE PRACTITIONER

## 2022-03-02 PROCEDURE — G9717 DOC PT DX DEP/BP F/U NT REQ: HCPCS | Performed by: NURSE PRACTITIONER

## 2022-03-02 PROCEDURE — 99213 OFFICE O/P EST LOW 20 MIN: CPT | Performed by: NURSE PRACTITIONER

## 2022-03-02 PROCEDURE — G8417 CALC BMI ABV UP PARAM F/U: HCPCS | Performed by: NURSE PRACTITIONER

## 2022-03-02 RX ORDER — DESVENLAFAXINE 100 MG/1
100 TABLET, EXTENDED RELEASE ORAL DAILY
Qty: 30 TABLET | Refills: 2 | Status: SHIPPED | OUTPATIENT
Start: 2022-03-02 | End: 2022-05-31 | Stop reason: SDUPTHER

## 2022-03-02 NOTE — PROGRESS NOTES
CHIEF COMPLAINT:  Yfn Jansen is a 59 y.o. female and was seen today for follow-up of psychiatric condition and psychotropic medication management. HPI:    Sandhya Ashley reports the following psychiatric symptoms by hx:  depression and anxiety. Overall symptoms have been present for months. Currently symptoms are of moderate severity. The symptoms occur less frequently and severely overall. Overall depression is less severe. Anxiety is intermittent. Pt reports medications are beneficial. Met with pt for appt today to review current treatment plan. FAMILY/SOCIAL HX: psychosocial stressors    REVIEW OF SYSTEMS:  Psychiatric symptoms being monitored for:  depression, anxiety  Appetite:improved, has lost 20 lb   Sleep: improved   Neuro: chronic pain    Visit Vitals  /64 (BP 1 Location: Right arm, BP Patient Position: Sitting, BP Cuff Size: Adult)   Pulse 67   Temp 98.4 °F (36.9 °C) (Temporal)   Resp 17   Ht 5' 8\" (1.727 m)   Wt 89.8 kg (198 lb)   LMP 12/25/2013   SpO2 98%   BMI 30.11 kg/m²       Side Effects:  none    MENTAL STATUS EXAM:   Sensorium  oriented to time, place and person   Relations cooperative   Appearance:  age appropriate and casually dressed   Motor Behavior:  gait stable and within normal limits   Speech:  normal volume   Thought Process: goal directed   Thought Content free of delusions and free of hallucinations   Suicidal ideations no intention   Homicidal ideations no intention   Mood:  anxious   Affect:  anxious   Memory recent  adequate   Memory remote:  adequate   Concentration:  adequate   Abstraction:  abstract   Insight:  good   Reliability good   Judgment:  good     MEDICAL DECISION MAKING:  Problems addressed today:    ICD-10-CM ICD-9-CM    1. [facility] ([facility]  F32.5 296.25    2. Post traumatic stress disorder (PTSD)  F43.10 309.81        Assessment:   Sandhya Ashley is responding to treatment. Symptoms are stable overall.  Discussed current medications and dosages. Reviewed medication goals and pt would ultimately like to wean off and determine if symptoms will remain stable. Will first wean off of wellbutrin and pt will provide feedback. Reviewed treatment goals and target symptoms to monitor for. Reinforced importance of self care and stress management. Plan:   1. Current Outpatient Medications   Medication Sig Dispense Refill    buPROPion (WELLBUTRIN) 75 mg tablet Take 1 Tablet by mouth daily. 30 Tablet 2    Desvenlafaxine 100 mg Tb24 Take 1 Tablet by mouth daily. 30 Tablet 2          medication changes made today: cont pristiq, dc wellbutrin    2. Counseling and coordination of care including instructions for treatment, risks/benefits, risk factor reduction and patient/family education. She agrees with the plan. Patient instructed to call with any side effects, questions or issues. 3.    Follow-up and Dispositions    · Return in about 3 months (around 6/2/2022).          3/2/2022  Nick Richard NP

## 2022-03-02 NOTE — PROGRESS NOTES
Chief Complaint   Patient presents with    Medication Management     Visit Vitals  /64 (BP 1 Location: Right arm, BP Patient Position: Sitting, BP Cuff Size: Adult)   Pulse 67   Temp 98.4 °F (36.9 °C) (Temporal)   Resp 17   Ht 5' 8\" (1.727 m)   Wt 89.8 kg (198 lb)   SpO2 98%   BMI 30.11 kg/m²     Prior to Admission medications    Medication Sig Start Date End Date Taking? Authorizing Provider   buPROPion [facility]) 75 mg tablet Take 1 Tablet by mouth daily. 12/21/21  Yes Keri Epperson NP   Desvenlafaxine 100 mg Tb24 Take 1 Tablet by mouth daily.  12/21/21  Yes Lali Chanel NP

## 2022-05-31 ENCOUNTER — OFFICE VISIT (OUTPATIENT)
Dept: BEHAVIORAL/MENTAL HEALTH CLINIC | Age: 62
End: 2022-05-31
Payer: MEDICARE

## 2022-05-31 VITALS
WEIGHT: 199.2 LBS | RESPIRATION RATE: 16 BRPM | DIASTOLIC BLOOD PRESSURE: 68 MMHG | HEART RATE: 73 BPM | OXYGEN SATURATION: 98 % | BODY MASS INDEX: 30.19 KG/M2 | HEIGHT: 68 IN | TEMPERATURE: 97 F | SYSTOLIC BLOOD PRESSURE: 104 MMHG

## 2022-05-31 DIAGNOSIS — F32.5 MAJOR DEPRESSION IN REMISSION (HCC): Primary | ICD-10-CM

## 2022-05-31 DIAGNOSIS — F43.10 POST TRAUMATIC STRESS DISORDER (PTSD): ICD-10-CM

## 2022-05-31 DIAGNOSIS — F43.21 GRIEF: ICD-10-CM

## 2022-05-31 PROCEDURE — G8417 CALC BMI ABV UP PARAM F/U: HCPCS | Performed by: NURSE PRACTITIONER

## 2022-05-31 PROCEDURE — G9717 DOC PT DX DEP/BP F/U NT REQ: HCPCS | Performed by: NURSE PRACTITIONER

## 2022-05-31 PROCEDURE — 3017F COLORECTAL CA SCREEN DOC REV: CPT | Performed by: NURSE PRACTITIONER

## 2022-05-31 PROCEDURE — 99213 OFFICE O/P EST LOW 20 MIN: CPT | Performed by: NURSE PRACTITIONER

## 2022-05-31 PROCEDURE — G8427 DOCREV CUR MEDS BY ELIG CLIN: HCPCS | Performed by: NURSE PRACTITIONER

## 2022-05-31 RX ORDER — DESVENLAFAXINE 100 MG/1
100 TABLET, EXTENDED RELEASE ORAL DAILY
Qty: 30 TABLET | Refills: 5 | Status: SHIPPED | OUTPATIENT
Start: 2022-05-31

## 2022-05-31 NOTE — PROGRESS NOTES
CHIEF COMPLAINT:  Haley Garcia is a 57 y.o. female and was seen today for follow-up of psychiatric condition and psychotropic medication management. HPI:    Robinson Paige reports the following psychiatric symptoms by hx:  depression and anxiety. Overall symptoms have been present for years. Currently symptoms are of moderate/low severity. The symptoms occur intermittently. Pt reports medications are beneficial. Met with pt for appt today to review current treatment plan. FAMILY/SOCIAL HX: psychosocial stressors    REVIEW OF SYSTEMS:  Psychiatric symptoms being monitored for:  depression, anxiety  Appetite:good   Sleep: good       Visit Vitals  /68 (BP 1 Location: Left arm, BP Patient Position: Sitting)   Pulse 73   Temp 97 °F (36.1 °C) (Temporal)   Resp 16   Ht 5' 8\" (1.727 m)   Wt 90.4 kg (199 lb 3.2 oz)   LMP 12/25/2013   SpO2 98%   BMI 30.29 kg/m²       Side Effects:  none    MENTAL STATUS EXAM:   Sensorium  oriented to time, place and person   Relations cooperative   Appearance:  age appropriate and casually dressed   Motor Behavior:  gait stable and within normal limits   Speech:  normal volume   Thought Process: goal directed   Thought Content free of delusions and free of hallucinations   Suicidal ideations no intention   Homicidal ideations no intention   Mood:  within normal limits   Affect:  wnl   Memory recent  adequate   Memory remote:  adequate   Concentration:  adequate   Abstraction:  abstract   Insight:  good   Reliability good   Judgment:  good     MEDICAL DECISION MAKING:  Problems addressed today:    ICD-10-CM ICD-9-CM    1. Major depression in remission ([facility]  F32.5 296.25    2. Post traumatic stress disorder (PTSD)  F43.10 309.81    3. Grief  F43.21 309.0        Assessment:   Robinson Paige is responding to treatment. Symptoms are stable overall. Discussed current medications and dosages. No changes made today. Reinforced skill set pt uses to continue with positive management of anxiety. Reviewed treatment goals and target symptoms to monitor for. Discussed transition of care. Plan:   1. Current Outpatient Medications   Medication Sig Dispense Refill    Desvenlafaxine 100 mg Tb24 Take 1 Tablet by mouth daily. 30 Tablet 5          medication changes made today: cont pristiq    2. Counseling and coordination of care including instructions for treatment, risks/benefits, risk factor reduction and patient/family education. She agrees with the plan. Patient instructed to call with any side effects, questions or issues. 3.    Follow-up and Dispositions    · Return transition.          5/31/2022  Janice Robles NP

## 2022-05-31 NOTE — PROGRESS NOTES
Chief Complaint   Patient presents with    Medication Management       Visit Vitals  /68 (BP 1 Location: Left arm, BP Patient Position: Sitting)   Pulse 73   Temp 97 °F (36.1 °C) (Temporal)   Resp 16   Ht 5' 8\" (1.727 m)   Wt 90.4 kg (199 lb 3.2 oz)   LMP 12/25/2013   SpO2 98%   BMI 30.29 kg/m²

## 2023-05-26 ENCOUNTER — HOSPITAL ENCOUNTER (EMERGENCY)
Facility: HOSPITAL | Age: 63
Discharge: HOME | End: 2023-05-26
Attending: EMERGENCY MEDICINE
Payer: MEDICARE

## 2023-05-26 ENCOUNTER — APPOINTMENT (OUTPATIENT)
Facility: HOSPITAL | Age: 63
End: 2023-05-26
Payer: MEDICARE

## 2023-05-26 VITALS
HEART RATE: 90 BPM | TEMPERATURE: 98.7 F | DIASTOLIC BLOOD PRESSURE: 72 MMHG | RESPIRATION RATE: 18 BRPM | SYSTOLIC BLOOD PRESSURE: 134 MMHG | OXYGEN SATURATION: 94 %

## 2023-05-26 DIAGNOSIS — R10.9 FLANK PAIN: Primary | ICD-10-CM

## 2023-05-26 LAB
ALBUMIN SERPL-MCNC: 4.2 G/DL (ref 3.5–5)
ALBUMIN/GLOB SERPL: 0.9 (ref 1.1–2.2)
ALP SERPL-CCNC: 126 U/L (ref 45–117)
ALT SERPL-CCNC: 27 U/L (ref 12–78)
ANION GAP SERPL CALC-SCNC: 13 MMOL/L (ref 5–15)
APPEARANCE UR: CLEAR
AST SERPL-CCNC: 27 U/L (ref 15–37)
BACTERIA URNS QL MICRO: NEGATIVE /HPF
BASOPHILS # BLD: 0.1 K/UL (ref 0–0.1)
BASOPHILS NFR BLD: 1 % (ref 0–1)
BILIRUB SERPL-MCNC: 0.6 MG/DL (ref 0.2–1)
BILIRUB UR QL: NEGATIVE
BUN SERPL-MCNC: 15 MG/DL (ref 6–20)
BUN/CREAT SERPL: 13 (ref 12–20)
CALCIUM SERPL-MCNC: 9.7 MG/DL (ref 8.5–10.1)
CHLORIDE SERPL-SCNC: 101 MMOL/L (ref 97–108)
CO2 SERPL-SCNC: 26 MMOL/L (ref 21–32)
COLOR UR: NORMAL
CREAT SERPL-MCNC: 1.14 MG/DL (ref 0.55–1.02)
DIFFERENTIAL METHOD BLD: NORMAL
EOSINOPHIL # BLD: 0.1 K/UL (ref 0–0.4)
EOSINOPHIL NFR BLD: 1 % (ref 0–7)
EPITH CASTS URNS QL MICRO: NORMAL /LPF
ERYTHROCYTE [DISTWIDTH] IN BLOOD BY AUTOMATED COUNT: 13.4 % (ref 11.5–14.5)
GLOBULIN SER CALC-MCNC: 4.6 G/DL (ref 2–4)
GLUCOSE SERPL-MCNC: 106 MG/DL (ref 65–100)
GLUCOSE UR STRIP.AUTO-MCNC: NEGATIVE MG/DL
HCT VFR BLD AUTO: 43.3 % (ref 35–47)
HGB BLD-MCNC: 14 G/DL (ref 11.5–16)
HGB UR QL STRIP: NEGATIVE
IMM GRANULOCYTES # BLD AUTO: 0 K/UL (ref 0–0.04)
IMM GRANULOCYTES NFR BLD AUTO: 0 % (ref 0–0.5)
KETONES UR QL STRIP.AUTO: NEGATIVE MG/DL
LEUKOCYTE ESTERASE UR QL STRIP.AUTO: NEGATIVE
LYMPHOCYTES # BLD: 1.5 K/UL (ref 0.8–3.5)
LYMPHOCYTES NFR BLD: 21 % (ref 12–49)
MCH RBC QN AUTO: 27.2 PG (ref 26–34)
MCHC RBC AUTO-ENTMCNC: 32.3 G/DL (ref 30–36.5)
MCV RBC AUTO: 84.2 FL (ref 80–99)
MONOCYTES # BLD: 0.6 K/UL (ref 0–1)
MONOCYTES NFR BLD: 8 % (ref 5–13)
NEUTS SEG # BLD: 4.8 K/UL (ref 1.8–8)
NEUTS SEG NFR BLD: 69 % (ref 32–75)
NITRITE UR QL STRIP.AUTO: NEGATIVE
NRBC # BLD: 0 K/UL (ref 0–0.01)
NRBC BLD-RTO: 0 PER 100 WBC
PH UR STRIP: 5 (ref 5–8)
PLATELET # BLD AUTO: 312 K/UL (ref 150–400)
PMV BLD AUTO: 9.8 FL (ref 8.9–12.9)
POTASSIUM SERPL-SCNC: 3.7 MMOL/L (ref 3.5–5.1)
PROT SERPL-MCNC: 8.8 G/DL (ref 6.4–8.2)
PROT UR STRIP-MCNC: NEGATIVE MG/DL
RBC # BLD AUTO: 5.14 M/UL (ref 3.8–5.2)
RBC #/AREA URNS HPF: NORMAL /HPF (ref 0–5)
SODIUM SERPL-SCNC: 140 MMOL/L (ref 136–145)
SP GR UR REFRACTOMETRY: 1.01 (ref 1–1.03)
URINE CULTURE IF INDICATED: NORMAL
UROBILINOGEN UR QL STRIP.AUTO: 0.2 EU/DL (ref 0.2–1)
WBC # BLD AUTO: 6.9 K/UL (ref 3.6–11)
WBC URNS QL MICRO: NORMAL /HPF (ref 0–4)

## 2023-05-26 PROCEDURE — 81001 URINALYSIS AUTO W/SCOPE: CPT

## 2023-05-26 PROCEDURE — 99284 EMERGENCY DEPT VISIT MOD MDM: CPT

## 2023-05-26 PROCEDURE — 74176 CT ABD & PELVIS W/O CONTRAST: CPT

## 2023-05-26 PROCEDURE — 80053 COMPREHEN METABOLIC PANEL: CPT

## 2023-05-26 PROCEDURE — 36415 COLL VENOUS BLD VENIPUNCTURE: CPT

## 2023-05-26 PROCEDURE — 85025 COMPLETE CBC W/AUTO DIFF WBC: CPT

## 2023-05-26 ASSESSMENT — ENCOUNTER SYMPTOMS
COLOR CHANGE: 0
ABDOMINAL PAIN: 0
BACK PAIN: 1

## 2023-05-26 NOTE — ED NOTES
Verbal shift change report given to Xenia Schulz RN (oncoming nurse) by Iliana Carroll RN (offgoing nurse). Report included the following information ED SBAR and Recent Results.        Tawanna Lui RN  05/26/23 6463

## 2023-05-26 NOTE — ED TRIAGE NOTES
Pt arrives c/o ongoing UTI dx at [facility] 2 days ago, put on cefdinir. Pt returned to [facility] with ongoing and worsening 5/10 int left flank pain. No pain on urination, UTI lab showed blood present on dx.

## 2023-05-26 NOTE — ED NOTES
[facility] EMERGENCY CTR  EMERGENCY DEPARTMENT ENCOUNTER      Pt Name: Zaid Borja  MRN: [id number]  [place] 1960  Date of evaluation: 2023  Provider: Heather Cobb PA-C    CHIEF COMPLAINT       Chief Complaint   Patient presents with    Flank Pain         HISTORY OF PRESENT ILLNESS   (Location/Symptom, Timing/Onset, Context/Setting, Quality, Duration, Modifying Factors, Severity)  Note limiting factors. 67-year-old female diagnosed with UTI and treated with cefdinir 2 days ago reports to ED with complaints of suprapubic pain and left flank pain. Patient's UA at [facility] 2 days ago showed blood in urine and patient has been taking cefdinir as prescribed. Yesterday patient noticed occasional left flank and some low back pain which has increased in intensity and duration since. Denies need for pain control at this time. Patient denies difficulty or pain urinating, fevers, nausea, vomiting, or diarrhea. Review of External Medical Records:     Nursing Notes were reviewed. REVIEW OF SYSTEMS    (2-9 systems for level 4, 10 or more for level 5)     Review of Systems   Constitutional:  Negative for appetite change and fever. Gastrointestinal:  Negative for abdominal pain. Genitourinary:  Negative for difficulty urinating, vaginal discharge and vaginal pain. Musculoskeletal:  Positive for back pain. Skin:  Negative for color change, pallor and rash. Except as noted above the remainder of the review of systems was reviewed and negative.        PAST MEDICAL HISTORY     Past Medical History:   Diagnosis Date    Depression     FH: diabetes mellitus     Fibromyalgia     Hypercholesterolemia     Stroke [facility]) 0380,,     right sided         SURGICAL HISTORY       Past Surgical History:   Procedure Laterality Date     SECTION      TONSILLECTOMY           CURRENT MEDICATIONS       Previous Medications    DESVENLAFAXINE SUCCINATE (PRISTIQ) 100 MG TB24 EXTENDED

## 2023-05-27 NOTE — DISCHARGE INSTRUCTIONS
As discussed, please follow-up with your gynecologist for a possible ultrasound of the pelvic mass which may be a uterine fibroid. Also, please follow-up with your primary care to repeat a metabolic panel looking at kidney function in the coming week or 2. If symptoms worsen or new symptoms arise, please report to the nearest emergency department. Thank you for allowing us to provide you with medical care today. We realize that you have many choices for your emergency care needs. We thank you for choosing [facility] [employer]. Please choose us in the future for any continued health care needs. The exam and treatment you received in the Emergency Department were for an emergent problem and are not intended as complete care. It is important that you follow up with a doctor, nurse practitioner, or physician's assistant for ongoing care. If your symptoms worsen or you do not improve as expected and you are unable to reach your usual health care provider, you should return to the Emergency Department. We are available 24 hours a day. Please make an appointment with your health care provider(s) for follow up of your Emergency Department visit. Take this sheet with you when you go to your follow-up visit.

## 2023-05-27 NOTE — ED NOTES
Discharge instructions on medications, follow up care and symptom management discussed with patient by Cathe Kehr, RN. Opportunity for questions was given and questions answered.         John Peralta RN  05/26/23 2023

## 2023-07-11 ENCOUNTER — HOSPITAL ENCOUNTER (EMERGENCY)
Facility: HOSPITAL | Age: 63
Discharge: HOME | End: 2023-07-11
Attending: STUDENT IN AN ORGANIZED HEALTH CARE EDUCATION/TRAINING PROGRAM
Payer: MEDICARE

## 2023-07-11 ENCOUNTER — APPOINTMENT (OUTPATIENT)
Facility: HOSPITAL | Age: 63
End: 2023-07-11
Payer: MEDICARE

## 2023-07-11 VITALS
OXYGEN SATURATION: 95 % | BODY MASS INDEX: 29.57 KG/M2 | HEART RATE: 76 BPM | DIASTOLIC BLOOD PRESSURE: 60 MMHG | SYSTOLIC BLOOD PRESSURE: 118 MMHG | RESPIRATION RATE: 16 BRPM | WEIGHT: 194.45 LBS | TEMPERATURE: 98.6 F

## 2023-07-11 DIAGNOSIS — R19.00 PELVIC MASS: ICD-10-CM

## 2023-07-11 DIAGNOSIS — R10.30 LOWER ABDOMINAL PAIN: ICD-10-CM

## 2023-07-11 DIAGNOSIS — R35.0 URINARY FREQUENCY: Primary | ICD-10-CM

## 2023-07-11 LAB
ALBUMIN SERPL-MCNC: 4 G/DL (ref 3.5–5)
ALBUMIN/GLOB SERPL: 0.9 (ref 1.1–2.2)
ALP SERPL-CCNC: 136 U/L (ref 45–117)
ALT SERPL-CCNC: 18 U/L (ref 12–78)
ANION GAP SERPL CALC-SCNC: 12 MMOL/L (ref 5–15)
APPEARANCE UR: CLEAR
AST SERPL-CCNC: 19 U/L (ref 15–37)
BACTERIA URNS QL MICRO: NEGATIVE /HPF
BASOPHILS # BLD: 0.1 K/UL (ref 0–0.1)
BASOPHILS NFR BLD: 1 % (ref 0–1)
BILIRUB SERPL-MCNC: 0.6 MG/DL (ref 0.2–1)
BILIRUB UR QL: NEGATIVE
BUN SERPL-MCNC: 11 MG/DL (ref 6–20)
BUN/CREAT SERPL: 10 (ref 12–20)
CALCIUM SERPL-MCNC: 9.6 MG/DL (ref 8.5–10.1)
CHLORIDE SERPL-SCNC: 104 MMOL/L (ref 97–108)
CO2 SERPL-SCNC: 24 MMOL/L (ref 21–32)
COLOR UR: ABNORMAL
CREAT SERPL-MCNC: 1.12 MG/DL (ref 0.55–1.02)
DIFFERENTIAL METHOD BLD: NORMAL
EOSINOPHIL # BLD: 0 K/UL (ref 0–0.4)
EOSINOPHIL NFR BLD: 0 % (ref 0–7)
EPITH CASTS URNS QL MICRO: ABNORMAL /LPF
ERYTHROCYTE [DISTWIDTH] IN BLOOD BY AUTOMATED COUNT: 13.3 % (ref 11.5–14.5)
GLOBULIN SER CALC-MCNC: 4.6 G/DL (ref 2–4)
GLUCOSE SERPL-MCNC: 117 MG/DL (ref 65–100)
GLUCOSE UR STRIP.AUTO-MCNC: NEGATIVE MG/DL
HCT VFR BLD AUTO: 42.2 % (ref 35–47)
HGB BLD-MCNC: 13.7 G/DL (ref 11.5–16)
HGB UR QL STRIP: ABNORMAL
IMM GRANULOCYTES # BLD AUTO: 0 K/UL (ref 0–0.04)
IMM GRANULOCYTES NFR BLD AUTO: 0 % (ref 0–0.5)
KETONES UR QL STRIP.AUTO: NEGATIVE MG/DL
LEUKOCYTE ESTERASE UR QL STRIP.AUTO: NEGATIVE
LYMPHOCYTES # BLD: 1.4 K/UL (ref 0.8–3.5)
LYMPHOCYTES NFR BLD: 19 % (ref 12–49)
MCH RBC QN AUTO: 26.8 PG (ref 26–34)
MCHC RBC AUTO-ENTMCNC: 32.5 G/DL (ref 30–36.5)
MCV RBC AUTO: 82.6 FL (ref 80–99)
MONOCYTES # BLD: 0.6 K/UL (ref 0–1)
MONOCYTES NFR BLD: 7 % (ref 5–13)
MUCOUS THREADS URNS QL MICRO: ABNORMAL /LPF
NEUTS SEG # BLD: 5.6 K/UL (ref 1.8–8)
NEUTS SEG NFR BLD: 73 % (ref 32–75)
NITRITE UR QL STRIP.AUTO: NEGATIVE
NRBC # BLD: 0 K/UL (ref 0–0.01)
NRBC BLD-RTO: 0 PER 100 WBC
PH UR STRIP: 5.5 (ref 5–8)
PLATELET # BLD AUTO: 313 K/UL (ref 150–400)
PMV BLD AUTO: 9.5 FL (ref 8.9–12.9)
POTASSIUM SERPL-SCNC: 3.7 MMOL/L (ref 3.5–5.1)
PROT SERPL-MCNC: 8.6 G/DL (ref 6.4–8.2)
PROT UR STRIP-MCNC: NEGATIVE MG/DL
RBC # BLD AUTO: 5.11 M/UL (ref 3.8–5.2)
RBC #/AREA URNS HPF: ABNORMAL /HPF (ref 0–5)
SODIUM SERPL-SCNC: 140 MMOL/L (ref 136–145)
SP GR UR REFRACTOMETRY: 1.02 (ref 1–1.03)
SPECIMEN HOLD: NORMAL
UROBILINOGEN UR QL STRIP.AUTO: 0.2 EU/DL (ref 0.2–1)
WBC # BLD AUTO: 7.7 K/UL (ref 3.6–11)
WBC URNS QL MICRO: ABNORMAL /HPF (ref 0–4)

## 2023-07-11 PROCEDURE — 81001 URINALYSIS AUTO W/SCOPE: CPT

## 2023-07-11 PROCEDURE — 36415 COLL VENOUS BLD VENIPUNCTURE: CPT

## 2023-07-11 PROCEDURE — 85025 COMPLETE CBC W/AUTO DIFF WBC: CPT

## 2023-07-11 PROCEDURE — 6370000000 HC RX 637 (ALT 250 FOR IP): Performed by: STUDENT IN AN ORGANIZED HEALTH CARE EDUCATION/TRAINING PROGRAM

## 2023-07-11 PROCEDURE — 74177 CT ABD & PELVIS W/CONTRAST: CPT

## 2023-07-11 PROCEDURE — 6360000004 HC RX CONTRAST MEDICATION: Performed by: STUDENT IN AN ORGANIZED HEALTH CARE EDUCATION/TRAINING PROGRAM

## 2023-07-11 PROCEDURE — 99285 EMERGENCY DEPT VISIT HI MDM: CPT

## 2023-07-11 PROCEDURE — 2580000003 HC RX 258: Performed by: STUDENT IN AN ORGANIZED HEALTH CARE EDUCATION/TRAINING PROGRAM

## 2023-07-11 PROCEDURE — 80053 COMPREHEN METABOLIC PANEL: CPT

## 2023-07-11 RX ORDER — ACETAMINOPHEN 325 MG/1
650 TABLET ORAL
Status: COMPLETED | OUTPATIENT
Start: 2023-07-11 | End: 2023-07-11

## 2023-07-11 RX ORDER — NITROFURANTOIN 25; 75 MG/1; MG/1
100 CAPSULE ORAL 2 TIMES DAILY
Qty: 10 CAPSULE | Refills: 0 | Status: SHIPPED | OUTPATIENT
Start: 2023-07-11 | End: 2023-07-16

## 2023-07-11 RX ORDER — ONDANSETRON 4 MG/1
4 TABLET, ORALLY DISINTEGRATING ORAL 3 TIMES DAILY PRN
Qty: 21 TABLET | Refills: 0 | Status: SHIPPED | OUTPATIENT
Start: 2023-07-11

## 2023-07-11 RX ORDER — 0.9 % SODIUM CHLORIDE 0.9 %
1000 INTRAVENOUS SOLUTION INTRAVENOUS ONCE
Status: COMPLETED | OUTPATIENT
Start: 2023-07-11 | End: 2023-07-11

## 2023-07-11 RX ADMIN — ACETAMINOPHEN 650 MG: 325 TABLET ORAL at 14:33

## 2023-07-11 RX ADMIN — IOPAMIDOL 100 ML: 755 INJECTION, SOLUTION INTRAVENOUS at 15:09

## 2023-07-11 RX ADMIN — SODIUM CHLORIDE 1000 ML: 9 INJECTION, SOLUTION INTRAVENOUS at 14:33

## 2023-07-11 ASSESSMENT — ENCOUNTER SYMPTOMS
EYE DISCHARGE: 0
DIARRHEA: 0
ABDOMINAL PAIN: 1
RHINORRHEA: 0
EYE REDNESS: 0
NAUSEA: 0
VOMITING: 0
COUGH: 0
SHORTNESS OF BREATH: 0

## 2023-07-11 ASSESSMENT — PAIN SCALES - GENERAL: PAINLEVEL_OUTOF10: 5

## 2023-07-11 ASSESSMENT — PAIN DESCRIPTION - DESCRIPTORS: DESCRIPTORS: BURNING

## 2023-07-11 ASSESSMENT — PAIN DESCRIPTION - LOCATION: LOCATION: PELVIS

## 2023-07-11 ASSESSMENT — PAIN - FUNCTIONAL ASSESSMENT: PAIN_FUNCTIONAL_ASSESSMENT: 0-10

## 2023-07-11 NOTE — ED NOTES
Patient ambulatory to bathroom. Gait steady. Patient back to bed, and placed back on monitor.         Rogelio Morillo RN  07/11/23 7192

## 2023-07-11 NOTE — DISCHARGE INSTRUCTIONS
Your urine did not show an obvious urinary tract infection today but given you are having dysuria and urinary frequency will cover for possible UTI. Your CT redemonstrated pelvic mass today, make sure you follow-up closely with your gynecologist for continued monitoring of this. Drink plenty fluids to stay hydrated. Return to the emergency department for any new or worsening symptoms.
immune

## 2023-07-11 NOTE — ED PROVIDER NOTES
afebrile and vital signs notable for mild tachycardia with a heart rate of 111. On examination she is well-appearing, nontoxic. There is left lower quadrant tenderness without guarding or rebound. No CVA tenderness. Differential diagnosis includes, but not limited to, cystitis, diverticulitis, appendicitis. Low suspicion for pyelonephritis based on symptoms, patient is afebrile, and no CVA tenderness. Labs reviewed. No leukocytosis or anemia. Electrolytes are stable. Creatinine is 1.12, consistent with baseline. LFTs are not elevated. UA does show small blood, otherwise no acute findings. CT of the abdomen pelvis does reveal a complex pelvic mass, which was seen on previous CT from 5/2023. On further discussion with patient, she actually had outpatient ultrasound performed on Friday and is awaiting results from her gynecologist.      Amount and/or Complexity of Data Reviewed  Labs: ordered. Decision-making details documented in ED Course. Radiology: ordered. Decision-making details documented in ED Course. Risk  OTC drugs. Prescription drug management. CONSULTS:  None    REASSESSMENT     On reevaluation, patient is feeling somewhat improved. Discussed results with patient. She was aware of the pelvic mass seen on the CT today, she is following with her gynecologist about this and reports she had an outpatient pelvic ultrasound performed on 7/7 and she is awaiting results and follow-up after ultrasound with her gynecologist.  Does note she had a little vaginal bleeding after the transvaginal ultrasound, but no other vaginal discharge or bleeding. No hematuria. Notified of hematuria seen on her UA today. Given she is symptomatic with dysuria and urinary frequency will treat for possible UTI with Macrobid. She was also instructed on follow-up with PCP for ER follow-up. Strict ER return precautions discussed.     The patient is asked to follow-up with their primary care provider in the

## 2023-07-11 NOTE — ED TRIAGE NOTES
70year old female pt comes to the ED via POV for a CC pelvic pain and dysuria. Pt states that dysuria and frequency started last night followed by some pelvic pain. Pt rates her pain a 5 and is A&Ox4.

## 2023-10-29 ENCOUNTER — HOSPITAL ENCOUNTER (EMERGENCY)
Facility: HOSPITAL | Age: 63
Discharge: HOME | End: 2023-10-29
Attending: EMERGENCY MEDICINE
Payer: MEDICARE

## 2023-10-29 VITALS
SYSTOLIC BLOOD PRESSURE: 116 MMHG | WEIGHT: 174.82 LBS | OXYGEN SATURATION: 96 % | TEMPERATURE: 98 F | HEIGHT: 68 IN | DIASTOLIC BLOOD PRESSURE: 82 MMHG | RESPIRATION RATE: 17 BRPM | BODY MASS INDEX: 26.5 KG/M2 | HEART RATE: 97 BPM

## 2023-10-29 DIAGNOSIS — E86.0 DEHYDRATION: ICD-10-CM

## 2023-10-29 DIAGNOSIS — R53.83 OTHER FATIGUE: Primary | ICD-10-CM

## 2023-10-29 LAB
ALBUMIN SERPL-MCNC: 3.7 G/DL (ref 3.5–5)
ALBUMIN/GLOB SERPL: 0.8 (ref 1.1–2.2)
ALP SERPL-CCNC: 120 U/L (ref 45–117)
ALT SERPL-CCNC: 38 U/L (ref 12–78)
ANION GAP SERPL CALC-SCNC: 10 MMOL/L (ref 5–15)
APPEARANCE UR: CLEAR
AST SERPL-CCNC: 26 U/L (ref 15–37)
BACTERIA URNS QL MICRO: NEGATIVE /HPF
BASOPHILS # BLD: 0 K/UL (ref 0–0.1)
BASOPHILS NFR BLD: 0 % (ref 0–1)
BILIRUB SERPL-MCNC: 0.7 MG/DL (ref 0.2–1)
BILIRUB UR QL: NEGATIVE
BUN SERPL-MCNC: 21 MG/DL (ref 6–20)
BUN/CREAT SERPL: 20 (ref 12–20)
CALCIUM SERPL-MCNC: 9.3 MG/DL (ref 8.5–10.1)
CHLORIDE SERPL-SCNC: 95 MMOL/L (ref 97–108)
CO2 SERPL-SCNC: 25 MMOL/L (ref 21–32)
COLOR UR: ABNORMAL
COMMENT:: NORMAL
CREAT SERPL-MCNC: 1.04 MG/DL (ref 0.55–1.02)
DIFFERENTIAL METHOD BLD: ABNORMAL
EOSINOPHIL # BLD: 0.2 K/UL (ref 0–0.4)
EOSINOPHIL NFR BLD: 3 % (ref 0–7)
EPITH CASTS URNS QL MICRO: ABNORMAL /LPF
ERYTHROCYTE [DISTWIDTH] IN BLOOD BY AUTOMATED COUNT: 13.7 % (ref 11.5–14.5)
GLOBULIN SER CALC-MCNC: 4.5 G/DL (ref 2–4)
GLUCOSE SERPL-MCNC: 152 MG/DL (ref 65–100)
GLUCOSE UR STRIP.AUTO-MCNC: 100 MG/DL
HCT VFR BLD AUTO: 41.4 % (ref 35–47)
HGB BLD-MCNC: 13.6 G/DL (ref 11.5–16)
HGB UR QL STRIP: NEGATIVE
IMM GRANULOCYTES # BLD AUTO: 0 K/UL
IMM GRANULOCYTES NFR BLD AUTO: 0 %
KETONES UR QL STRIP.AUTO: NEGATIVE MG/DL
LEUKOCYTE ESTERASE UR QL STRIP.AUTO: NEGATIVE
LYMPHOCYTES # BLD: 1.4 K/UL (ref 0.8–3.5)
LYMPHOCYTES NFR BLD: 25 % (ref 12–49)
MAGNESIUM SERPL-MCNC: 2.1 MG/DL (ref 1.6–2.4)
MCH RBC QN AUTO: 27.6 PG (ref 26–34)
MCHC RBC AUTO-ENTMCNC: 32.9 G/DL (ref 30–36.5)
MCV RBC AUTO: 84.1 FL (ref 80–99)
MONOCYTES # BLD: 0.1 K/UL (ref 0–1)
MONOCYTES NFR BLD: 2 % (ref 5–13)
NEUTS SEG # BLD: 3.9 K/UL (ref 1.8–8)
NEUTS SEG NFR BLD: 70 % (ref 32–75)
NITRITE UR QL STRIP.AUTO: NEGATIVE
NRBC # BLD: 0 K/UL (ref 0–0.01)
NRBC BLD-RTO: 0 PER 100 WBC
PH UR STRIP: 6 (ref 5–8)
PLATELET # BLD AUTO: 278 K/UL (ref 150–400)
PMV BLD AUTO: 11.2 FL (ref 8.9–12.9)
POTASSIUM SERPL-SCNC: 3.7 MMOL/L (ref 3.5–5.1)
PROT SERPL-MCNC: 8.2 G/DL (ref 6.4–8.2)
PROT UR STRIP-MCNC: 100 MG/DL
RBC # BLD AUTO: 4.92 M/UL (ref 3.8–5.2)
RBC #/AREA URNS HPF: ABNORMAL /HPF (ref 0–5)
RBC MORPH BLD: ABNORMAL
SODIUM SERPL-SCNC: 130 MMOL/L (ref 136–145)
SP GR UR REFRACTOMETRY: 1.02 (ref 1–1.03)
SPECIMEN HOLD: NORMAL
URINE CULTURE IF INDICATED: ABNORMAL
UROBILINOGEN UR QL STRIP.AUTO: 0.2 EU/DL (ref 0.2–1)
WBC # BLD AUTO: 5.6 K/UL (ref 3.6–11)
WBC URNS QL MICRO: ABNORMAL /HPF (ref 0–4)

## 2023-10-29 PROCEDURE — 93005 ELECTROCARDIOGRAM TRACING: CPT | Performed by: EMERGENCY MEDICINE

## 2023-10-29 PROCEDURE — 83735 ASSAY OF MAGNESIUM: CPT

## 2023-10-29 PROCEDURE — 81001 URINALYSIS AUTO W/SCOPE: CPT

## 2023-10-29 PROCEDURE — 99284 EMERGENCY DEPT VISIT MOD MDM: CPT

## 2023-10-29 PROCEDURE — 6360000002 HC RX W HCPCS: Performed by: EMERGENCY MEDICINE

## 2023-10-29 PROCEDURE — 96374 THER/PROPH/DIAG INJ IV PUSH: CPT

## 2023-10-29 PROCEDURE — 36415 COLL VENOUS BLD VENIPUNCTURE: CPT

## 2023-10-29 PROCEDURE — 87086 URINE CULTURE/COLONY COUNT: CPT

## 2023-10-29 PROCEDURE — 80053 COMPREHEN METABOLIC PANEL: CPT

## 2023-10-29 PROCEDURE — 96361 HYDRATE IV INFUSION ADD-ON: CPT

## 2023-10-29 PROCEDURE — 85025 COMPLETE CBC W/AUTO DIFF WBC: CPT

## 2023-10-29 PROCEDURE — 2580000003 HC RX 258: Performed by: EMERGENCY MEDICINE

## 2023-10-29 RX ORDER — 0.9 % SODIUM CHLORIDE 0.9 %
1000 INTRAVENOUS SOLUTION INTRAVENOUS ONCE
Status: COMPLETED | OUTPATIENT
Start: 2023-10-29 | End: 2023-10-29

## 2023-10-29 RX ORDER — PROCHLORPERAZINE MALEATE 5 MG/1
5 TABLET ORAL EVERY 8 HOURS PRN
COMMUNITY

## 2023-10-29 RX ORDER — ONDANSETRON 2 MG/ML
4 INJECTION INTRAMUSCULAR; INTRAVENOUS ONCE
Status: COMPLETED | OUTPATIENT
Start: 2023-10-29 | End: 2023-10-29

## 2023-10-29 RX ORDER — DEXAMETHASONE 4 MG/1
8 TABLET ORAL 2 TIMES DAILY WITH MEALS
COMMUNITY

## 2023-10-29 RX ADMIN — SODIUM CHLORIDE 1000 ML: 9 INJECTION, SOLUTION INTRAVENOUS at 09:27

## 2023-10-29 RX ADMIN — ONDANSETRON 4 MG: 2 INJECTION INTRAMUSCULAR; INTRAVENOUS at 09:35

## 2023-10-29 ASSESSMENT — ENCOUNTER SYMPTOMS
NAUSEA: 1
CONSTIPATION: 0
VISUAL CHANGE: 0
ABDOMINAL PAIN: 0
BACK PAIN: 0
COLOR CHANGE: 0
SHORTNESS OF BREATH: 0
VOMITING: 0
DIARRHEA: 0
COUGH: 0

## 2023-10-29 ASSESSMENT — LIFESTYLE VARIABLES
HOW MANY STANDARD DRINKS CONTAINING ALCOHOL DO YOU HAVE ON A TYPICAL DAY: PATIENT DOES NOT DRINK
HOW OFTEN DO YOU HAVE A DRINK CONTAINING ALCOHOL: NEVER

## 2023-10-29 ASSESSMENT — PAIN - FUNCTIONAL ASSESSMENT
PAIN_FUNCTIONAL_ASSESSMENT: NONE - DENIES PAIN

## 2023-10-29 NOTE — ED TRIAGE NOTES
Patient reports she had her first round of chemo for six hours this past Tuesday to treat ovarian cancer. Generalized weakness with nausea began yesterday. Pt reports taking Zofran at home this morning, but it concerned she may need IV fluids. Pt ambulates with steady gait. Speech clear.

## 2023-10-29 NOTE — ED NOTES
Reviewed discharge instructions with the patient and her , highlighting the importance of medical follow-up and s&s requiring more immediate medical attention. Patient also encouraged to follow-up with Dermatology regarding the spot on her right leg and with her PCP or Oncologist regarding c/o constipation. Patient and her  verbalize understanding of the instructions given. VSS. Gait steady. Patient able to tolerate eating applesauce prior to discharge.      Cheryl Ruth RN  10/29/23 7790

## 2023-10-29 NOTE — ED PROVIDER NOTES
(Preliminary)  Rhythm: normal sinus rhythm; and regular . Rate (approx.): 98; Axis: normal; P wave: normal; QRS interval: normal ; ST/T wave: non-specific changes; Other findings: borderline ekg   [FD]      ED Course User Index  [FD] France Sloan MD     Update:  Patient remains in no acute distress. Reassuring lab work, concentrated urine at bedside. Patient states symptomatic improvement with the use of IV hydration. Discussed with patient and  at bedside, they are comfortable with discharge home with ongoing oral hydration, already have been prescribed antiemetics, return precautions given. CONSULTS:  None    PROCEDURES:  Unless otherwise noted below, none     Procedures        FINAL IMPRESSION    No diagnosis found. DISPOSITION/PLAN   DISPOSITION        PATIENT REFERRED TO:  No follow-up provider specified.     DISCHARGE MEDICATIONS:  New Prescriptions    No medications on file     Controlled Substances Monitoring:          No data to display                (Please note that portions of this note were completed with a voice recognition program.  Efforts were made to edit the dictations but occasionally words are mis-transcribed.)    Elizabeth Keith MD (electronically signed)  Attending Emergency Physician           France Sloan MD  10/29/23 3519

## 2023-10-29 NOTE — ED NOTES
Patient ambulated with steady gait post IV fluids. Patient able to tolerate small sips of water.      Amada Ellsworth RN  10/29/23 7100

## 2023-10-30 LAB
BACTERIA SPEC CULT: NORMAL
CC UR VC: NORMAL
EKG ATRIAL RATE: 98 BPM
EKG DIAGNOSIS: NORMAL
EKG P AXIS: 62 DEGREES
EKG P-R INTERVAL: 126 MS
EKG Q-T INTERVAL: 336 MS
EKG QRS DURATION: 82 MS
EKG QTC CALCULATION (BAZETT): 428 MS
EKG R AXIS: 22 DEGREES
EKG T AXIS: 11 DEGREES
EKG VENTRICULAR RATE: 98 BPM
SERVICE CMNT-IMP: NORMAL

## 2023-10-30 PROCEDURE — 93010 ELECTROCARDIOGRAM REPORT: CPT | Performed by: SPECIALIST

## 2023-10-31 ENCOUNTER — APPOINTMENT (OUTPATIENT)
Facility: HOSPITAL | Age: 63
End: 2023-10-31
Payer: MEDICARE

## 2023-10-31 ENCOUNTER — HOSPITAL ENCOUNTER (EMERGENCY)
Facility: HOSPITAL | Age: 63
Discharge: HOME | End: 2023-10-31
Attending: STUDENT IN AN ORGANIZED HEALTH CARE EDUCATION/TRAINING PROGRAM
Payer: MEDICARE

## 2023-10-31 VITALS
HEART RATE: 84 BPM | OXYGEN SATURATION: 98 % | RESPIRATION RATE: 16 BRPM | BODY MASS INDEX: 26.2 KG/M2 | SYSTOLIC BLOOD PRESSURE: 114 MMHG | TEMPERATURE: 98.7 F | WEIGHT: 172.84 LBS | HEIGHT: 68 IN | DIASTOLIC BLOOD PRESSURE: 67 MMHG

## 2023-10-31 DIAGNOSIS — E04.1 THYROID NODULE: ICD-10-CM

## 2023-10-31 DIAGNOSIS — E86.0 DEHYDRATION: ICD-10-CM

## 2023-10-31 DIAGNOSIS — R19.7 DIARRHEA, UNSPECIFIED TYPE: Primary | ICD-10-CM

## 2023-10-31 DIAGNOSIS — R79.89 ELEVATED SERUM CREATININE: ICD-10-CM

## 2023-10-31 DIAGNOSIS — D84.9 IMMUNOCOMPROMISED STATE (HCC): ICD-10-CM

## 2023-10-31 DIAGNOSIS — R79.89 ELEVATED TSH: ICD-10-CM

## 2023-10-31 LAB
ALBUMIN SERPL-MCNC: 3.4 G/DL (ref 3.5–5)
ALBUMIN/GLOB SERPL: 0.8 (ref 1.1–2.2)
ALP SERPL-CCNC: 115 U/L (ref 45–117)
ALT SERPL-CCNC: 38 U/L (ref 12–78)
ANION GAP SERPL CALC-SCNC: 14 MMOL/L (ref 5–15)
APPEARANCE UR: CLEAR
AST SERPL-CCNC: 24 U/L (ref 15–37)
BACTERIA URNS QL MICRO: ABNORMAL /HPF
BASOPHILS # BLD: 0 K/UL (ref 0–0.1)
BASOPHILS NFR BLD: 1 % (ref 0–1)
BILIRUB SERPL-MCNC: 0.7 MG/DL (ref 0.2–1)
BILIRUB UR QL: NEGATIVE
BUN SERPL-MCNC: 20 MG/DL (ref 6–20)
BUN/CREAT SERPL: 16 (ref 12–20)
CALCIUM SERPL-MCNC: 9.1 MG/DL (ref 8.5–10.1)
CHLORIDE SERPL-SCNC: 92 MMOL/L (ref 97–108)
CO2 SERPL-SCNC: 26 MMOL/L (ref 21–32)
COLOR UR: ABNORMAL
COMMENT:: NORMAL
CREAT SERPL-MCNC: 1.26 MG/DL (ref 0.55–1.02)
DIFFERENTIAL METHOD BLD: ABNORMAL
EKG ATRIAL RATE: 105 BPM
EKG DIAGNOSIS: NORMAL
EKG P AXIS: 53 DEGREES
EKG P-R INTERVAL: 126 MS
EKG Q-T INTERVAL: 338 MS
EKG QRS DURATION: 78 MS
EKG QTC CALCULATION (BAZETT): 446 MS
EKG R AXIS: 28 DEGREES
EKG T AXIS: 14 DEGREES
EKG VENTRICULAR RATE: 105 BPM
EOSINOPHIL # BLD: 0.3 K/UL (ref 0–0.4)
EOSINOPHIL NFR BLD: 11 % (ref 0–7)
EPITH CASTS URNS QL MICRO: ABNORMAL /LPF
ERYTHROCYTE [DISTWIDTH] IN BLOOD BY AUTOMATED COUNT: 13.1 % (ref 11.5–14.5)
GLOBULIN SER CALC-MCNC: 4.4 G/DL (ref 2–4)
GLUCOSE SERPL-MCNC: 135 MG/DL (ref 65–100)
GLUCOSE UR STRIP.AUTO-MCNC: 100 MG/DL
HCT VFR BLD AUTO: 37.1 % (ref 35–47)
HGB BLD-MCNC: 12.5 G/DL (ref 11.5–16)
HGB UR QL STRIP: NEGATIVE
HYALINE CASTS URNS QL MICRO: ABNORMAL /LPF (ref 0–5)
IMM GRANULOCYTES # BLD AUTO: 0 K/UL
IMM GRANULOCYTES NFR BLD AUTO: 0 %
KETONES UR QL STRIP.AUTO: NEGATIVE MG/DL
LEUKOCYTE ESTERASE UR QL STRIP.AUTO: ABNORMAL
LYMPHOCYTES # BLD: 0.8 K/UL (ref 0.8–3.5)
LYMPHOCYTES NFR BLD: 31 % (ref 12–49)
MAGNESIUM SERPL-MCNC: 2.4 MG/DL (ref 1.6–2.4)
MCH RBC QN AUTO: 27.8 PG (ref 26–34)
MCHC RBC AUTO-ENTMCNC: 33.7 G/DL (ref 30–36.5)
MCV RBC AUTO: 82.6 FL (ref 80–99)
MONOCYTES # BLD: 0 K/UL (ref 0–1)
MONOCYTES NFR BLD: 1 % (ref 5–13)
MUCOUS THREADS URNS QL MICRO: ABNORMAL /LPF
NEUTS SEG # BLD: 1.5 K/UL (ref 1.8–8)
NEUTS SEG NFR BLD: 56 % (ref 32–75)
NITRITE UR QL STRIP.AUTO: NEGATIVE
NRBC # BLD: 0 K/UL (ref 0–0.01)
NRBC BLD-RTO: 0 PER 100 WBC
NT PRO BNP: 72 PG/ML (ref 0–125)
PH UR STRIP: 6 (ref 5–8)
PLATELET # BLD AUTO: 205 K/UL (ref 150–400)
PMV BLD AUTO: 10.6 FL (ref 8.9–12.9)
POTASSIUM SERPL-SCNC: 3.2 MMOL/L (ref 3.5–5.1)
PROT SERPL-MCNC: 7.8 G/DL (ref 6.4–8.2)
PROT UR STRIP-MCNC: ABNORMAL MG/DL
RBC # BLD AUTO: 4.49 M/UL (ref 3.8–5.2)
RBC #/AREA URNS HPF: ABNORMAL /HPF (ref 0–5)
RBC MORPH BLD: ABNORMAL
SODIUM SERPL-SCNC: 132 MMOL/L (ref 136–145)
SP GR UR REFRACTOMETRY: 1.01 (ref 1–1.03)
SPECIMEN HOLD: NORMAL
TROPONIN I SERPL HS-MCNC: 16 NG/L (ref 0–51)
TSH SERPL DL<=0.05 MIU/L-ACNC: 4.2 UIU/ML (ref 0.36–3.74)
UROBILINOGEN UR QL STRIP.AUTO: 0.2 EU/DL (ref 0.2–1)
WBC # BLD AUTO: 2.6 K/UL (ref 3.6–11)
WBC URNS QL MICRO: ABNORMAL /HPF (ref 0–4)

## 2023-10-31 PROCEDURE — 6360000004 HC RX CONTRAST MEDICATION: Performed by: STUDENT IN AN ORGANIZED HEALTH CARE EDUCATION/TRAINING PROGRAM

## 2023-10-31 PROCEDURE — 96360 HYDRATION IV INFUSION INIT: CPT

## 2023-10-31 PROCEDURE — 80053 COMPREHEN METABOLIC PANEL: CPT

## 2023-10-31 PROCEDURE — 93005 ELECTROCARDIOGRAM TRACING: CPT | Performed by: STUDENT IN AN ORGANIZED HEALTH CARE EDUCATION/TRAINING PROGRAM

## 2023-10-31 PROCEDURE — 99285 EMERGENCY DEPT VISIT HI MDM: CPT

## 2023-10-31 PROCEDURE — 84443 ASSAY THYROID STIM HORMONE: CPT

## 2023-10-31 PROCEDURE — 85025 COMPLETE CBC W/AUTO DIFF WBC: CPT

## 2023-10-31 PROCEDURE — 84484 ASSAY OF TROPONIN QUANT: CPT

## 2023-10-31 PROCEDURE — 36415 COLL VENOUS BLD VENIPUNCTURE: CPT

## 2023-10-31 PROCEDURE — 74177 CT ABD & PELVIS W/CONTRAST: CPT

## 2023-10-31 PROCEDURE — 83880 ASSAY OF NATRIURETIC PEPTIDE: CPT

## 2023-10-31 PROCEDURE — 83735 ASSAY OF MAGNESIUM: CPT

## 2023-10-31 PROCEDURE — 2580000003 HC RX 258: Performed by: STUDENT IN AN ORGANIZED HEALTH CARE EDUCATION/TRAINING PROGRAM

## 2023-10-31 PROCEDURE — 71275 CT ANGIOGRAPHY CHEST: CPT

## 2023-10-31 PROCEDURE — 81001 URINALYSIS AUTO W/SCOPE: CPT

## 2023-10-31 RX ORDER — 0.9 % SODIUM CHLORIDE 0.9 %
500 INTRAVENOUS SOLUTION INTRAVENOUS ONCE
Status: COMPLETED | OUTPATIENT
Start: 2023-10-31 | End: 2023-10-31

## 2023-10-31 RX ORDER — AMOXICILLIN AND CLAVULANATE POTASSIUM 875; 125 MG/1; MG/1
1 TABLET, FILM COATED ORAL 2 TIMES DAILY
Qty: 20 TABLET | Refills: 0 | Status: SHIPPED | OUTPATIENT
Start: 2023-10-31 | End: 2023-11-10

## 2023-10-31 RX ADMIN — IOPAMIDOL 100 ML: 755 INJECTION, SOLUTION INTRAVENOUS at 12:42

## 2023-10-31 RX ADMIN — SODIUM CHLORIDE 500 ML: 9 INJECTION, SOLUTION INTRAVENOUS at 13:42

## 2023-10-31 RX ADMIN — SODIUM CHLORIDE 500 ML: 9 INJECTION, SOLUTION INTRAVENOUS at 11:56

## 2023-10-31 ASSESSMENT — ENCOUNTER SYMPTOMS
DIARRHEA: 1
SHORTNESS OF BREATH: 1

## 2023-10-31 NOTE — ED TRIAGE NOTES
Triage: pt arrives to the ER accompanied by spouse for c/o diarrhea and dizziness starting last night. Last received chemo 10/24. Pt was seen here Sunday for constipation. Denies fever, urinary symptoms, chest pain, vaginal discharge/bleeding.

## 2023-10-31 NOTE — ED NOTES
Pt ambulatory out of ED with discharge instructions and prescriptions in hand given by Dr. Ana Regalado; pt verbalized understanding of discharge paperwork and time allotted for questions. VSS. Pt alert and oriented. Pt accompanied by .         Saeed Miguel RN  10/31/23 1896

## 2023-10-31 NOTE — DISCHARGE INSTRUCTIONS
You are seen in the emergency department for diarrhea. We discussed your labs and imaging and gave you some IV fluids. You have some nonspecific findings on your CT that I would like to put you on some antibiotics for given your immunocompromise state. Please take the Augmentin as prescribed. You also have a couple other things that I wanted to discuss. You have a left thyroid lobe nodule with an elevated TSH. Your primary care doctor can follow this up further outpatient. You have also got a calcified aorta which your primary care doctor can follow-up with as well. I also am treating for a possible urinary tract infection. Please follow-up your PCP, return as needed.

## 2023-11-06 ENCOUNTER — HOSPITAL ENCOUNTER (EMERGENCY)
Facility: HOSPITAL | Age: 63
Discharge: HOME | End: 2023-11-06
Attending: EMERGENCY MEDICINE
Payer: MEDICARE

## 2023-11-06 VITALS
RESPIRATION RATE: 16 BRPM | HEIGHT: 68 IN | HEART RATE: 85 BPM | SYSTOLIC BLOOD PRESSURE: 109 MMHG | WEIGHT: 176.59 LBS | OXYGEN SATURATION: 100 % | DIASTOLIC BLOOD PRESSURE: 58 MMHG | BODY MASS INDEX: 26.76 KG/M2 | TEMPERATURE: 98.4 F

## 2023-11-06 DIAGNOSIS — R53.1 GENERAL WEAKNESS: Primary | ICD-10-CM

## 2023-11-06 LAB
ALBUMIN SERPL-MCNC: 3.1 G/DL (ref 3.5–5)
ALBUMIN/GLOB SERPL: 0.7 (ref 1.1–2.2)
ALP SERPL-CCNC: 131 U/L (ref 45–117)
ALT SERPL-CCNC: 40 U/L (ref 12–78)
ANION GAP SERPL CALC-SCNC: 12 MMOL/L (ref 5–15)
AST SERPL-CCNC: 32 U/L (ref 15–37)
BASOPHILS # BLD: 0 K/UL (ref 0–0.1)
BASOPHILS NFR BLD: 0 % (ref 0–1)
BILIRUB SERPL-MCNC: 0.3 MG/DL (ref 0.2–1)
BUN SERPL-MCNC: 9 MG/DL (ref 6–20)
BUN/CREAT SERPL: 8 (ref 12–20)
CALCIUM SERPL-MCNC: 9.4 MG/DL (ref 8.5–10.1)
CHLORIDE SERPL-SCNC: 97 MMOL/L (ref 97–108)
CO2 SERPL-SCNC: 28 MMOL/L (ref 21–32)
CREAT SERPL-MCNC: 1.11 MG/DL (ref 0.55–1.02)
DIFFERENTIAL METHOD BLD: ABNORMAL
EOSINOPHIL # BLD: 0 K/UL (ref 0–0.4)
EOSINOPHIL NFR BLD: 1 % (ref 0–7)
ERYTHROCYTE [DISTWIDTH] IN BLOOD BY AUTOMATED COUNT: 13.2 % (ref 11.5–14.5)
GLOBULIN SER CALC-MCNC: 4.5 G/DL (ref 2–4)
GLUCOSE SERPL-MCNC: 138 MG/DL (ref 65–100)
HCT VFR BLD AUTO: 31.3 % (ref 35–47)
HGB BLD-MCNC: 10.4 G/DL (ref 11.5–16)
IMM GRANULOCYTES # BLD AUTO: 0 K/UL
IMM GRANULOCYTES NFR BLD AUTO: 0 %
LYMPHOCYTES # BLD: 1.2 K/UL (ref 0.8–3.5)
LYMPHOCYTES NFR BLD: 49 % (ref 12–49)
MCH RBC QN AUTO: 27.6 PG (ref 26–34)
MCHC RBC AUTO-ENTMCNC: 33.2 G/DL (ref 30–36.5)
MCV RBC AUTO: 83 FL (ref 80–99)
MONOCYTES # BLD: 0.1 K/UL (ref 0–1)
MONOCYTES NFR BLD: 6 % (ref 5–13)
NEUTS SEG # BLD: 1.1 K/UL (ref 1.8–8)
NEUTS SEG NFR BLD: 44 % (ref 32–75)
NRBC # BLD: 0 K/UL (ref 0–0.01)
NRBC BLD-RTO: 0 PER 100 WBC
PLATELET # BLD AUTO: 136 K/UL (ref 150–400)
PMV BLD AUTO: 10.3 FL (ref 8.9–12.9)
POTASSIUM SERPL-SCNC: 3.5 MMOL/L (ref 3.5–5.1)
PROT SERPL-MCNC: 7.6 G/DL (ref 6.4–8.2)
RBC # BLD AUTO: 3.77 M/UL (ref 3.8–5.2)
RBC MORPH BLD: ABNORMAL
SODIUM SERPL-SCNC: 137 MMOL/L (ref 136–145)
WBC # BLD AUTO: 2.4 K/UL (ref 3.6–11)

## 2023-11-06 PROCEDURE — 36415 COLL VENOUS BLD VENIPUNCTURE: CPT

## 2023-11-06 PROCEDURE — 2580000003 HC RX 258: Performed by: EMERGENCY MEDICINE

## 2023-11-06 PROCEDURE — 99284 EMERGENCY DEPT VISIT MOD MDM: CPT

## 2023-11-06 PROCEDURE — 85025 COMPLETE CBC W/AUTO DIFF WBC: CPT

## 2023-11-06 PROCEDURE — 93005 ELECTROCARDIOGRAM TRACING: CPT | Performed by: EMERGENCY MEDICINE

## 2023-11-06 PROCEDURE — 80053 COMPREHEN METABOLIC PANEL: CPT

## 2023-11-06 RX ORDER — 0.9 % SODIUM CHLORIDE 0.9 %
1000 INTRAVENOUS SOLUTION INTRAVENOUS ONCE
Status: COMPLETED | OUTPATIENT
Start: 2023-11-06 | End: 2023-11-06

## 2023-11-06 RX ADMIN — SODIUM CHLORIDE 1000 ML: 9 INJECTION, SOLUTION INTRAVENOUS at 18:32

## 2023-11-06 ASSESSMENT — PAIN - FUNCTIONAL ASSESSMENT: PAIN_FUNCTIONAL_ASSESSMENT: NONE - DENIES PAIN

## 2023-11-06 NOTE — ED PROVIDER NOTES
[facility] EMERGENCY CTR  EMERGENCY DEPARTMENT ENCOUNTER      Pt Name: Anderson Gutierrez  MRN: [id number]  [address] [place] 1960  Date of evaluation: 2023  Provider: Rosio Chicas MD    CHIEF COMPLAINT     No chief complaint on file. HISTORY OF PRESENT ILLNESS   (Location/Symptom, Timing/Onset, Context/Setting, Quality, Duration, Modifying Factors, Severity)  Note limiting factors. 68-year-old with a history of hyperlipidemia, stroke, depression, fibromyalgia, ovarian cancer (diagnosis 2023). She presents accompanied by her  with complaints of \"I need fluids. \"  She states that she has felt weak in her arms and legs over the past few days. She has felt her heart racing. She is status post total abdominal hysterectomy since her diagnosis of ovarian cancer. She had her first chemotherapy about 2 weeks ago. She has had nausea and dry heaves at times. Her last diarrhea was 2 days ago. Her appetite and fluid intake have been good. She has had good urine output. Review of External Medical Records:     Nursing Notes were reviewed. REVIEW OF SYSTEMS    (2-9 systems for level 4, 10 or more for level 5)     Review of Systems    Except as noted above the remainder of the review of systems was reviewed and negative.        PAST MEDICAL HISTORY     Past Medical History:   Diagnosis Date    Depression     FH: diabetes mellitus     Fibromyalgia     Hypercholesterolemia     Ovarian cancer ([facility]     Stroke [facility]) 5260,37, 2012    right sided         SURGICAL HISTORY       Past Surgical History:   Procedure Laterality Date     SECTION      PORTACATH PLACEMENT      TONSILLECTOMY           CURRENT MEDICATIONS       Previous Medications    AMOXICILLIN-CLAVULANATE (AUGMENTIN) 875-125 MG PER TABLET    Take 1 tablet by mouth 2 times daily for 10 days    DEXAMETHASONE (DECADRON) 4 MG TABLET    Take 2 tablets by mouth 2 times daily (with meals) Take for three days following chemo

## 2023-11-06 NOTE — ED TRIAGE NOTES
Pt presented to the ED for IV fluids. Reports tachycardia, weakness,and shakiness. Seen multiple times in ED for similar symptoms. Oncologist at [facility] for stage 1 ovarian had hysterectomy. Current chemo q 3 wks. Received first dose.

## 2023-11-07 LAB
EKG ATRIAL RATE: 79 BPM
EKG DIAGNOSIS: NORMAL
EKG P AXIS: 25 DEGREES
EKG P-R INTERVAL: 116 MS
EKG Q-T INTERVAL: 382 MS
EKG QRS DURATION: 92 MS
EKG QTC CALCULATION (BAZETT): 438 MS
EKG R AXIS: 41 DEGREES
EKG T AXIS: 22 DEGREES
EKG VENTRICULAR RATE: 79 BPM

## 2023-11-07 PROCEDURE — 93010 ELECTROCARDIOGRAM REPORT: CPT | Performed by: INTERNAL MEDICINE

## 2023-11-07 NOTE — ED NOTES
Pain assessment on discharge was   Condition Stable  Patient discharged to home  Patient education was completed  Education taught to patient  Teaching method used was handout and verbal  Understanding of teaching was good  Patient was discharged ambulatory  Discharged with family  Valuables were given to patient remained in possession of belongings during stay      Jaylin Blackman RN  11/06/23 1951

## 2023-11-19 ENCOUNTER — HOSPITAL ENCOUNTER (EMERGENCY)
Facility: HOSPITAL | Age: 63
Discharge: HOME | End: 2023-11-19
Attending: STUDENT IN AN ORGANIZED HEALTH CARE EDUCATION/TRAINING PROGRAM
Payer: MEDICARE

## 2023-11-19 ENCOUNTER — APPOINTMENT (OUTPATIENT)
Facility: HOSPITAL | Age: 63
End: 2023-11-19
Payer: MEDICARE

## 2023-11-19 VITALS
HEIGHT: 68 IN | TEMPERATURE: 97.9 F | RESPIRATION RATE: 17 BRPM | BODY MASS INDEX: 25.76 KG/M2 | OXYGEN SATURATION: 100 % | DIASTOLIC BLOOD PRESSURE: 56 MMHG | HEART RATE: 84 BPM | SYSTOLIC BLOOD PRESSURE: 117 MMHG | WEIGHT: 170 LBS

## 2023-11-19 DIAGNOSIS — E83.42 HYPOMAGNESEMIA: Primary | ICD-10-CM

## 2023-11-19 DIAGNOSIS — R91.1 NODULE OF LOWER LOBE OF RIGHT LUNG: ICD-10-CM

## 2023-11-19 LAB
ALBUMIN SERPL-MCNC: 3.7 G/DL (ref 3.5–5)
ALBUMIN/GLOB SERPL: 0.9 (ref 1.1–2.2)
ALP SERPL-CCNC: 128 U/L (ref 45–117)
ALT SERPL-CCNC: 44 U/L (ref 12–78)
ANION GAP SERPL CALC-SCNC: 14 MMOL/L (ref 5–15)
AST SERPL-CCNC: 43 U/L (ref 15–37)
BASOPHILS # BLD: 0 K/UL (ref 0–0.1)
BASOPHILS NFR BLD: 0 % (ref 0–1)
BILIRUB SERPL-MCNC: 0.6 MG/DL (ref 0.2–1)
BUN SERPL-MCNC: 12 MG/DL (ref 6–20)
BUN/CREAT SERPL: 11 (ref 12–20)
CALCIUM SERPL-MCNC: 9.2 MG/DL (ref 8.5–10.1)
CHLORIDE SERPL-SCNC: 98 MMOL/L (ref 97–108)
CO2 SERPL-SCNC: 24 MMOL/L (ref 21–32)
COMMENT:: NORMAL
CREAT SERPL-MCNC: 1.14 MG/DL (ref 0.55–1.02)
DIFFERENTIAL METHOD BLD: ABNORMAL
EKG ATRIAL RATE: 108 BPM
EKG DIAGNOSIS: NORMAL
EKG P AXIS: 68 DEGREES
EKG P-R INTERVAL: 120 MS
EKG Q-T INTERVAL: 314 MS
EKG QRS DURATION: 84 MS
EKG QTC CALCULATION (BAZETT): 420 MS
EKG R AXIS: 35 DEGREES
EKG T AXIS: 27 DEGREES
EKG VENTRICULAR RATE: 108 BPM
EOSINOPHIL # BLD: 0.2 K/UL (ref 0–0.4)
EOSINOPHIL NFR BLD: 14 % (ref 0–7)
ERYTHROCYTE [DISTWIDTH] IN BLOOD BY AUTOMATED COUNT: 14.2 % (ref 11.5–14.5)
GLOBULIN SER CALC-MCNC: 4 G/DL (ref 2–4)
GLUCOSE SERPL-MCNC: 175 MG/DL (ref 65–100)
HCT VFR BLD AUTO: 33 % (ref 35–47)
HGB BLD-MCNC: 10.9 G/DL (ref 11.5–16)
IMM GRANULOCYTES # BLD AUTO: 0 K/UL
IMM GRANULOCYTES NFR BLD AUTO: 0 %
LIPASE SERPL-CCNC: 16 U/L (ref 13–75)
LYMPHOCYTES # BLD: 0.6 K/UL (ref 0.8–3.5)
LYMPHOCYTES NFR BLD: 43 % (ref 12–49)
MAGNESIUM SERPL-MCNC: 1.4 MG/DL (ref 1.6–2.4)
MCH RBC QN AUTO: 27.5 PG (ref 26–34)
MCHC RBC AUTO-ENTMCNC: 33 G/DL (ref 30–36.5)
MCV RBC AUTO: 83.3 FL (ref 80–99)
MONOCYTES # BLD: 0 K/UL (ref 0–1)
MONOCYTES NFR BLD: 2 % (ref 5–13)
NEUTS SEG # BLD: 0.5 K/UL (ref 1.8–8)
NEUTS SEG NFR BLD: 41 % (ref 32–75)
NRBC # BLD: 0 K/UL (ref 0–0.01)
NRBC BLD-RTO: 0 PER 100 WBC
PLATELET # BLD AUTO: 307 K/UL (ref 150–400)
PMV BLD AUTO: 9.2 FL (ref 8.9–12.9)
POTASSIUM SERPL-SCNC: 3.5 MMOL/L (ref 3.5–5.1)
PROT SERPL-MCNC: 7.7 G/DL (ref 6.4–8.2)
RBC # BLD AUTO: 3.96 M/UL (ref 3.8–5.2)
RBC MORPH BLD: ABNORMAL
RBC MORPH BLD: ABNORMAL
SODIUM SERPL-SCNC: 136 MMOL/L (ref 136–145)
SPECIMEN HOLD: NORMAL
TROPONIN I SERPL HS-MCNC: 9 NG/L (ref 0–51)
WBC # BLD AUTO: 1.3 K/UL (ref 3.6–11)

## 2023-11-19 PROCEDURE — 83690 ASSAY OF LIPASE: CPT

## 2023-11-19 PROCEDURE — 6360000004 HC RX CONTRAST MEDICATION: Performed by: STUDENT IN AN ORGANIZED HEALTH CARE EDUCATION/TRAINING PROGRAM

## 2023-11-19 PROCEDURE — 99285 EMERGENCY DEPT VISIT HI MDM: CPT

## 2023-11-19 PROCEDURE — 80053 COMPREHEN METABOLIC PANEL: CPT

## 2023-11-19 PROCEDURE — 2580000003 HC RX 258: Performed by: STUDENT IN AN ORGANIZED HEALTH CARE EDUCATION/TRAINING PROGRAM

## 2023-11-19 PROCEDURE — 93005 ELECTROCARDIOGRAM TRACING: CPT | Performed by: STUDENT IN AN ORGANIZED HEALTH CARE EDUCATION/TRAINING PROGRAM

## 2023-11-19 PROCEDURE — 96365 THER/PROPH/DIAG IV INF INIT: CPT

## 2023-11-19 PROCEDURE — 83735 ASSAY OF MAGNESIUM: CPT

## 2023-11-19 PROCEDURE — 6360000002 HC RX W HCPCS: Performed by: STUDENT IN AN ORGANIZED HEALTH CARE EDUCATION/TRAINING PROGRAM

## 2023-11-19 PROCEDURE — 96361 HYDRATE IV INFUSION ADD-ON: CPT

## 2023-11-19 PROCEDURE — 96366 THER/PROPH/DIAG IV INF ADDON: CPT

## 2023-11-19 PROCEDURE — 71275 CT ANGIOGRAPHY CHEST: CPT

## 2023-11-19 PROCEDURE — 36415 COLL VENOUS BLD VENIPUNCTURE: CPT

## 2023-11-19 PROCEDURE — 85025 COMPLETE CBC W/AUTO DIFF WBC: CPT

## 2023-11-19 PROCEDURE — 84484 ASSAY OF TROPONIN QUANT: CPT

## 2023-11-19 RX ORDER — MAGNESIUM OXIDE 400 MG/1
400 TABLET ORAL DAILY
Qty: 30 TABLET | Refills: 1 | Status: SHIPPED | OUTPATIENT
Start: 2023-11-19

## 2023-11-19 RX ORDER — SODIUM CHLORIDE, SODIUM LACTATE, POTASSIUM CHLORIDE, AND CALCIUM CHLORIDE .6; .31; .03; .02 G/100ML; G/100ML; G/100ML; G/100ML
1000 INJECTION, SOLUTION INTRAVENOUS ONCE
Status: COMPLETED | OUTPATIENT
Start: 2023-11-19 | End: 2023-11-19

## 2023-11-19 RX ORDER — MAGNESIUM SULFATE IN WATER 40 MG/ML
2000 INJECTION, SOLUTION INTRAVENOUS ONCE
Status: COMPLETED | OUTPATIENT
Start: 2023-11-19 | End: 2023-11-19

## 2023-11-19 RX ADMIN — MAGNESIUM SULFATE HEPTAHYDRATE 2000 MG: 40 INJECTION, SOLUTION INTRAVENOUS at 10:45

## 2023-11-19 RX ADMIN — SODIUM CHLORIDE, POTASSIUM CHLORIDE, SODIUM LACTATE AND CALCIUM CHLORIDE 1000 ML: 600; 310; 30; 20 INJECTION, SOLUTION INTRAVENOUS at 10:12

## 2023-11-19 RX ADMIN — IOPAMIDOL 100 ML: 755 INJECTION, SOLUTION INTRAVENOUS at 13:32

## 2023-11-19 RX ADMIN — SODIUM CHLORIDE, POTASSIUM CHLORIDE, SODIUM LACTATE AND CALCIUM CHLORIDE 1000 ML: 600; 310; 30; 20 INJECTION, SOLUTION INTRAVENOUS at 14:23

## 2023-11-19 ASSESSMENT — PAIN SCALES - GENERAL: PAINLEVEL_OUTOF10: 0

## 2023-11-19 NOTE — ED NOTES
Patient able to ambulate to the bathroom with steady gait. Reports feeling better but feels like her heart is still racing especially with ambulation. HR after ambulation 113. MD aware.       Maggie Michaud RN  11/19/23 0213

## 2023-11-19 NOTE — DISCHARGE INSTRUCTIONS
You presented to ED with fatigue lightheadedness and feeling poorly. You are on second round of chemotherapy. Labs show a low magnesium. This was repleted with IV magnesium here. Commend taking prescribed oral magnesium and follow-up with your primary care doctor for reevaluation. Additionally you remained tachycardic and with your chemotherapy and cancer evaluation for pulm embolism was indicated. CT scans obtained. No blood clots were seen but a small 10 mm nodule was noted in the right lower lung. This may be inflammatory in nature however repeat imaging is indicated for follow-up of resolution. Please inform your cancer team of this finding as well as your primary care doctor. If symptoms change or worsen you have nausea vomiting unable to eat or drink anything or have chest pain or shortness of breath return to the ED for further evaluation.

## 2023-11-19 NOTE — ED NOTES
Patient stable at time of discharge. Reviewed discharge instructions, medications, and follow up with patient. Allowed time for questions. Patient verbalized understanding. Ambulatory out of department with steady gait accompanied by spouse.       Agustin Hernandez RN  11/19/23 9952

## 2023-11-19 NOTE — ED TRIAGE NOTES
Patient arrives with c/o palpitations, general fatigue, weakness, leg weakness, lightheaded, and had some nausea (took zofran 3 hrs ago with relief). Patient is currently undergoing chemo for ovarian cancer. Last treatment was Tuesday. Denies vomiting, diarrhea, fever.

## 2023-11-22 ENCOUNTER — HOSPITAL ENCOUNTER (EMERGENCY)
Facility: HOSPITAL | Age: 63
Discharge: HOME | End: 2023-11-22
Attending: STUDENT IN AN ORGANIZED HEALTH CARE EDUCATION/TRAINING PROGRAM
Payer: MEDICARE

## 2023-11-22 VITALS
BODY MASS INDEX: 26.25 KG/M2 | OXYGEN SATURATION: 100 % | DIASTOLIC BLOOD PRESSURE: 62 MMHG | SYSTOLIC BLOOD PRESSURE: 112 MMHG | RESPIRATION RATE: 18 BRPM | HEART RATE: 86 BPM | WEIGHT: 172.62 LBS | TEMPERATURE: 98.1 F

## 2023-11-22 DIAGNOSIS — D70.1 CHEMOTHERAPY-INDUCED NEUTROPENIA (HCC): ICD-10-CM

## 2023-11-22 DIAGNOSIS — R19.7 DIARRHEA, UNSPECIFIED TYPE: Primary | ICD-10-CM

## 2023-11-22 DIAGNOSIS — T45.1X5A CHEMOTHERAPY-INDUCED NEUTROPENIA (HCC): ICD-10-CM

## 2023-11-22 LAB
ALBUMIN SERPL-MCNC: 3.3 G/DL (ref 3.5–5)
ALBUMIN/GLOB SERPL: 0.8 (ref 1.1–2.2)
ALP SERPL-CCNC: 118 U/L (ref 45–117)
ALT SERPL-CCNC: 38 U/L (ref 12–78)
ANION GAP SERPL CALC-SCNC: 9 MMOL/L (ref 5–15)
AST SERPL-CCNC: 32 U/L (ref 15–37)
BASOPHILS # BLD: 0 K/UL (ref 0–0.1)
BASOPHILS NFR BLD: 1 % (ref 0–1)
BILIRUB SERPL-MCNC: 0.3 MG/DL (ref 0.2–1)
BUN SERPL-MCNC: 13 MG/DL (ref 6–20)
BUN/CREAT SERPL: 12 (ref 12–20)
CALCIUM SERPL-MCNC: 9.3 MG/DL (ref 8.5–10.1)
CHLORIDE SERPL-SCNC: 101 MMOL/L (ref 97–108)
CO2 SERPL-SCNC: 25 MMOL/L (ref 21–32)
CREAT SERPL-MCNC: 1.06 MG/DL (ref 0.55–1.02)
DIFFERENTIAL METHOD BLD: ABNORMAL
EOSINOPHIL # BLD: 0.1 K/UL (ref 0–0.4)
EOSINOPHIL NFR BLD: 10 % (ref 0–7)
ERYTHROCYTE [DISTWIDTH] IN BLOOD BY AUTOMATED COUNT: 14.3 % (ref 11.5–14.5)
GLOBULIN SER CALC-MCNC: 4 G/DL (ref 2–4)
GLUCOSE SERPL-MCNC: 129 MG/DL (ref 65–100)
HCT VFR BLD AUTO: 27.4 % (ref 35–47)
HGB BLD-MCNC: 9.2 G/DL (ref 11.5–16)
IMM GRANULOCYTES # BLD AUTO: 0 K/UL (ref 0–0.04)
IMM GRANULOCYTES NFR BLD AUTO: 0 % (ref 0–0.5)
LYMPHOCYTES # BLD: 0.5 K/UL (ref 0.8–3.5)
LYMPHOCYTES NFR BLD: 71 % (ref 12–49)
MAGNESIUM SERPL-MCNC: 1.7 MG/DL (ref 1.6–2.4)
MCH RBC QN AUTO: 28 PG (ref 26–34)
MCHC RBC AUTO-ENTMCNC: 33.6 G/DL (ref 30–36.5)
MCV RBC AUTO: 83.5 FL (ref 80–99)
MONOCYTES # BLD: 0.1 K/UL (ref 0–1)
MONOCYTES NFR BLD: 7 % (ref 5–13)
NEUTS SEG # BLD: 0.1 K/UL (ref 1.8–8)
NEUTS SEG NFR BLD: 11 % (ref 32–75)
NRBC # BLD: 0 K/UL (ref 0–0.01)
NRBC BLD-RTO: 0 PER 100 WBC
PLATELET # BLD AUTO: 218 K/UL (ref 150–400)
PMV BLD AUTO: 9.6 FL (ref 8.9–12.9)
POTASSIUM SERPL-SCNC: 3.8 MMOL/L (ref 3.5–5.1)
PROT SERPL-MCNC: 7.3 G/DL (ref 6.4–8.2)
RBC # BLD AUTO: 3.28 M/UL (ref 3.8–5.2)
RBC MORPH BLD: ABNORMAL
SODIUM SERPL-SCNC: 135 MMOL/L (ref 136–145)
WBC # BLD AUTO: 0.8 K/UL (ref 3.6–11)

## 2023-11-22 PROCEDURE — 96360 HYDRATION IV INFUSION INIT: CPT

## 2023-11-22 PROCEDURE — 96361 HYDRATE IV INFUSION ADD-ON: CPT

## 2023-11-22 PROCEDURE — 83735 ASSAY OF MAGNESIUM: CPT

## 2023-11-22 PROCEDURE — 2580000003 HC RX 258: Performed by: STUDENT IN AN ORGANIZED HEALTH CARE EDUCATION/TRAINING PROGRAM

## 2023-11-22 PROCEDURE — 80053 COMPREHEN METABOLIC PANEL: CPT

## 2023-11-22 PROCEDURE — 85025 COMPLETE CBC W/AUTO DIFF WBC: CPT

## 2023-11-22 PROCEDURE — 36415 COLL VENOUS BLD VENIPUNCTURE: CPT

## 2023-11-22 PROCEDURE — 99284 EMERGENCY DEPT VISIT MOD MDM: CPT

## 2023-11-22 RX ORDER — SODIUM CHLORIDE, SODIUM LACTATE, POTASSIUM CHLORIDE, AND CALCIUM CHLORIDE .6; .31; .03; .02 G/100ML; G/100ML; G/100ML; G/100ML
1000 INJECTION, SOLUTION INTRAVENOUS ONCE
Status: COMPLETED | OUTPATIENT
Start: 2023-11-22 | End: 2023-11-22

## 2023-11-22 RX ADMIN — SODIUM CHLORIDE, POTASSIUM CHLORIDE, SODIUM LACTATE AND CALCIUM CHLORIDE 1000 ML: 600; 310; 30; 20 INJECTION, SOLUTION INTRAVENOUS at 10:58

## 2023-11-22 ASSESSMENT — ENCOUNTER SYMPTOMS
COUGH: 0
RHINORRHEA: 0
DIARRHEA: 1
EYE REDNESS: 0
VOMITING: 0
ABDOMINAL PAIN: 0
NAUSEA: 0
EYE DISCHARGE: 0

## 2023-11-22 NOTE — ED TRIAGE NOTES
Pt arrives with diarrhea yesterday from [place] and reports here to get \"IV fluids\". Pt reports she can tell when she gets dehydrated because she starts feeling weak and shaky. Pt is currently on chemotherapy, second treatment was 2 days ago.

## 2023-11-22 NOTE — ED NOTES
The patient left the Emergency Department ambulatory, alert and oriented and in no acute distress. The patient was encouraged to call or return to the ED for worsening issues or problems and was encouraged to schedule a follow up appointment for continuing care. The patient verbalized understanding of discharge instructions and all questions were answered. The patient has no further concerns at this time.          Isabela Zepeda RN  11/22/23 5882

## 2023-11-22 NOTE — ED PROVIDER NOTES
[facility] EMERGENCY CTR  EMERGENCY DEPARTMENT ENCOUNTER      Pt Name: Julia Thomason  MRN: [id number]  [address] [facility] 1960  Date of evaluation: 2023  Provider: Bharti Agrawal DO    CHIEF COMPLAINT       Chief Complaint   Patient presents with    Dehydration         HISTORY OF PRESENT ILLNESS    HPI    Julia Thomason is a 70 y.o. female with a history of ovarian cancer currently on chemotherapy last round of chemotherapy on , who presents to the emergency department for evaluation of diarrhea. Patient notes that she has been having low magnesium levels, was recently started on oral magnesium for this. States initially doing improved until yesterday she began to have diarrhea which she relates to the magnesium. Denies any blood or black stools. No abdominal pain. No fevers. Does endorse some mild nausea but no vomiting. Patient concerned that she may be dehydrated as she is feeling generally weak and shaky which has been common with her dehydration in the past.    Nursing Notes were reviewed. REVIEW OF SYSTEMS       Review of Systems   Constitutional:  Positive for fatigue. Negative for chills and fever. HENT:  Negative for congestion and rhinorrhea. Eyes:  Negative for discharge and redness. Respiratory:  Negative for cough. Gastrointestinal:  Positive for diarrhea. Negative for abdominal pain, nausea and vomiting. Neurological:  Negative for speech difficulty. Psychiatric/Behavioral:  Negative for agitation.             PAST MEDICAL HISTORY     Past Medical History:   Diagnosis Date    Depression     FH: diabetes mellitus     Fibromyalgia     Hypercholesterolemia     Ovarian cancer ([address])     Stroke [facility]) 0973,49, 2012    right sided         SURGICAL HISTORY       Past Surgical History:   Procedure Laterality Date     SECTION      PORTACATH PLACEMENT      TONSILLECTOMY           CURRENT MEDICATIONS       Discharge Medication List as of 2023  1:25 PM

## 2023-12-03 ENCOUNTER — HOSPITAL ENCOUNTER (EMERGENCY)
Facility: HOSPITAL | Age: 63
Discharge: HOME | End: 2023-12-03
Attending: STUDENT IN AN ORGANIZED HEALTH CARE EDUCATION/TRAINING PROGRAM
Payer: MEDICARE

## 2023-12-03 VITALS
TEMPERATURE: 98.4 F | WEIGHT: 173.72 LBS | RESPIRATION RATE: 15 BRPM | DIASTOLIC BLOOD PRESSURE: 68 MMHG | SYSTOLIC BLOOD PRESSURE: 125 MMHG | BODY MASS INDEX: 26.41 KG/M2 | OXYGEN SATURATION: 100 % | HEART RATE: 87 BPM

## 2023-12-03 DIAGNOSIS — E86.0 DEHYDRATION: Primary | ICD-10-CM

## 2023-12-03 LAB
ALBUMIN SERPL-MCNC: 3.2 G/DL (ref 3.5–5)
ALBUMIN/GLOB SERPL: 0.7 (ref 1.1–2.2)
ALP SERPL-CCNC: 116 U/L (ref 45–117)
ALT SERPL-CCNC: 19 U/L (ref 12–78)
ANION GAP SERPL CALC-SCNC: 9 MMOL/L (ref 5–15)
AST SERPL-CCNC: 21 U/L (ref 15–37)
BASOPHILS # BLD: 0 K/UL (ref 0–0.1)
BASOPHILS NFR BLD: 1 % (ref 0–1)
BILIRUB SERPL-MCNC: 0.4 MG/DL (ref 0.2–1)
BUN SERPL-MCNC: 12 MG/DL (ref 6–20)
BUN/CREAT SERPL: 11 (ref 12–20)
CALCIUM SERPL-MCNC: 9.3 MG/DL (ref 8.5–10.1)
CHLORIDE SERPL-SCNC: 103 MMOL/L (ref 97–108)
CO2 SERPL-SCNC: 27 MMOL/L (ref 21–32)
CREAT SERPL-MCNC: 1.05 MG/DL (ref 0.55–1.02)
DIFFERENTIAL METHOD BLD: ABNORMAL
EOSINOPHIL # BLD: 0 K/UL (ref 0–0.4)
EOSINOPHIL NFR BLD: 1 % (ref 0–7)
ERYTHROCYTE [DISTWIDTH] IN BLOOD BY AUTOMATED COUNT: 15.4 % (ref 11.5–14.5)
GLOBULIN SER CALC-MCNC: 4.4 G/DL (ref 2–4)
GLUCOSE SERPL-MCNC: 120 MG/DL (ref 65–100)
HCT VFR BLD AUTO: 25.6 % (ref 35–47)
HGB BLD-MCNC: 8.4 G/DL (ref 11.5–16)
IMM GRANULOCYTES # BLD AUTO: 0 K/UL (ref 0–0.04)
IMM GRANULOCYTES NFR BLD AUTO: 0 % (ref 0–0.5)
LYMPHOCYTES # BLD: 0.9 K/UL (ref 0.8–3.5)
LYMPHOCYTES NFR BLD: 27 % (ref 12–49)
MAGNESIUM SERPL-MCNC: 1.5 MG/DL (ref 1.6–2.4)
MCH RBC QN AUTO: 27.6 PG (ref 26–34)
MCHC RBC AUTO-ENTMCNC: 32.8 G/DL (ref 30–36.5)
MCV RBC AUTO: 84.2 FL (ref 80–99)
MONOCYTES # BLD: 0.2 K/UL (ref 0–1)
MONOCYTES NFR BLD: 7 % (ref 5–13)
NEUTS SEG # BLD: 2.1 K/UL (ref 1.8–8)
NEUTS SEG NFR BLD: 64 % (ref 32–75)
NRBC # BLD: 0 K/UL (ref 0–0.01)
NRBC BLD-RTO: 0 PER 100 WBC
PLATELET # BLD AUTO: 38 K/UL (ref 150–400)
PMV BLD AUTO: 9.7 FL (ref 8.9–12.9)
POTASSIUM SERPL-SCNC: 3.8 MMOL/L (ref 3.5–5.1)
PROT SERPL-MCNC: 7.6 G/DL (ref 6.4–8.2)
RBC # BLD AUTO: 3.04 M/UL (ref 3.8–5.2)
RBC MORPH BLD: ABNORMAL
SODIUM SERPL-SCNC: 139 MMOL/L (ref 136–145)
WBC # BLD AUTO: 3.2 K/UL (ref 3.6–11)

## 2023-12-03 PROCEDURE — 83735 ASSAY OF MAGNESIUM: CPT

## 2023-12-03 PROCEDURE — 99284 EMERGENCY DEPT VISIT MOD MDM: CPT

## 2023-12-03 PROCEDURE — 85025 COMPLETE CBC W/AUTO DIFF WBC: CPT

## 2023-12-03 PROCEDURE — 36415 COLL VENOUS BLD VENIPUNCTURE: CPT

## 2023-12-03 PROCEDURE — 80053 COMPREHEN METABOLIC PANEL: CPT

## 2023-12-03 PROCEDURE — 2580000003 HC RX 258: Performed by: STUDENT IN AN ORGANIZED HEALTH CARE EDUCATION/TRAINING PROGRAM

## 2023-12-03 PROCEDURE — 96360 HYDRATION IV INFUSION INIT: CPT

## 2023-12-03 RX ORDER — 0.9 % SODIUM CHLORIDE 0.9 %
1000 INTRAVENOUS SOLUTION INTRAVENOUS ONCE
Status: COMPLETED | OUTPATIENT
Start: 2023-12-03 | End: 2023-12-03

## 2023-12-03 RX ADMIN — SODIUM CHLORIDE 1000 ML: 9 INJECTION, SOLUTION INTRAVENOUS at 13:11

## 2023-12-03 RX ADMIN — SODIUM CHLORIDE 1000 ML: 9 INJECTION, SOLUTION INTRAVENOUS at 12:44

## 2023-12-03 NOTE — ED PROVIDER NOTES
HPI    70 y.o. female presenting with lightheadedness and weakness. She has had similar symptoms in the past in the setting of dehydration. She had her last chemotherapy treatment approximately 5 days ago. She has had 3 cycles out of 4 for treatment of ovarian cancer, treated at [facility] primarily. She has not had fevers or chills. She has not had vomiting. She has had diarrhea recently in the context of taking magnesium supplementation. She has had poor appetite. Denies new pain complaints or any pain at this time. History primarily obtained from the patient and her  provided supplementary information. Yonathan Krause   has a past medical history of Depression, FH: diabetes mellitus, Fibromyalgia, Hypercholesterolemia, Ovarian cancer ([address]), and Stroke ([address]). Physical Exam  Vitals reviewed. Constitutional:       General: She is not in acute distress. Appearance: Normal appearance. HENT:      Head: Normocephalic. Mouth/Throat:      Mouth: Mucous membranes are moist.   Eyes:      Extraocular Movements: Extraocular movements intact. Pupils: Pupils are equal, round, and reactive to light. Cardiovascular:      Rate and Rhythm: Regular rhythm. Tachycardia present. Pulses: Normal pulses. Pulmonary:      Effort: Pulmonary effort is normal. No respiratory distress. Abdominal:      General: Abdomen is flat. Musculoskeletal:      Cervical back: Neck supple. No rigidity. Right lower leg: No edema. Left lower leg: No edema. Skin:     General: Skin is warm. Capillary Refill: Capillary refill takes less than 2 seconds. Neurological:      General: No focal deficit present. Mental Status: She is alert. Mental status is at baseline.    Psychiatric:         Mood and Affect: Mood normal.           LABORATORY TESTS:  Labs Reviewed   COMPREHENSIVE METABOLIC PANEL - Abnormal; Notable for the following components:       Result Value    Glucose 120

## 2023-12-03 NOTE — ED NOTES
Pt discharged in stable condition at this time. MD and this RN reviewed discharge instructions, prescriptions, and follow up with patient and her  at bedside. Pt and her  verbalized understanding and denies any needs or questions at this time.         Gurdeep Ybarra RN  12/03/23 [id number]

## 2023-12-03 NOTE — ED TRIAGE NOTES
Patient arrives ambulatory to the ED with concerns for dehydration. She is c/o high HR and slight shakiness. She is getting chemo for ovarian cancer. Last received chemo on 11/26.

## 2024-01-06 ENCOUNTER — HOSPITAL ENCOUNTER (EMERGENCY)
Facility: HOSPITAL | Age: 64
Discharge: HOME | End: 2024-01-06
Attending: STUDENT IN AN ORGANIZED HEALTH CARE EDUCATION/TRAINING PROGRAM
Payer: MEDICARE

## 2024-01-06 VITALS
BODY MASS INDEX: 26.8 KG/M2 | DIASTOLIC BLOOD PRESSURE: 73 MMHG | RESPIRATION RATE: 16 BRPM | OXYGEN SATURATION: 98 % | HEIGHT: 68 IN | WEIGHT: 176.81 LBS | HEART RATE: 97 BPM | TEMPERATURE: 97.9 F | SYSTOLIC BLOOD PRESSURE: 131 MMHG

## 2024-01-06 DIAGNOSIS — E86.0 DEHYDRATION: Primary | ICD-10-CM

## 2024-01-06 LAB
ALBUMIN SERPL-MCNC: 3.5 G/DL (ref 3.5–5)
ALBUMIN/GLOB SERPL: 0.8 (ref 1.1–2.2)
ALP SERPL-CCNC: 109 U/L (ref 45–117)
ALT SERPL-CCNC: 26 U/L (ref 12–78)
ANION GAP SERPL CALC-SCNC: 12 MMOL/L (ref 5–15)
AST SERPL-CCNC: 29 U/L (ref 15–37)
BASOPHILS # BLD: 0 K/UL (ref 0–0.1)
BASOPHILS NFR BLD: 1 % (ref 0–1)
BILIRUB SERPL-MCNC: 0.7 MG/DL (ref 0.2–1)
BUN SERPL-MCNC: 11 MG/DL (ref 6–20)
BUN/CREAT SERPL: 10 (ref 12–20)
CALCIUM SERPL-MCNC: 8.8 MG/DL (ref 8.5–10.1)
CHLORIDE SERPL-SCNC: 102 MMOL/L (ref 97–108)
CO2 SERPL-SCNC: 24 MMOL/L (ref 21–32)
CREAT SERPL-MCNC: 1.14 MG/DL (ref 0.55–1.02)
DIFFERENTIAL METHOD BLD: ABNORMAL
EKG ATRIAL RATE: 104 BPM
EKG DIAGNOSIS: NORMAL
EKG P AXIS: 70 DEGREES
EKG P-R INTERVAL: 130 MS
EKG Q-T INTERVAL: 322 MS
EKG QRS DURATION: 86 MS
EKG QTC CALCULATION (BAZETT): 423 MS
EKG R AXIS: 37 DEGREES
EKG T AXIS: 37 DEGREES
EKG VENTRICULAR RATE: 104 BPM
EOSINOPHIL # BLD: 0.1 K/UL (ref 0–0.4)
EOSINOPHIL NFR BLD: 4 % (ref 0–7)
ERYTHROCYTE [DISTWIDTH] IN BLOOD BY AUTOMATED COUNT: 16 % (ref 11.5–14.5)
GLOBULIN SER CALC-MCNC: 4.3 G/DL (ref 2–4)
GLUCOSE SERPL-MCNC: 145 MG/DL (ref 65–100)
HCT VFR BLD AUTO: 33.6 % (ref 35–47)
HGB BLD-MCNC: 11.3 G/DL (ref 11.5–16)
IMM GRANULOCYTES # BLD AUTO: 0 K/UL (ref 0–0.04)
IMM GRANULOCYTES NFR BLD AUTO: 0 % (ref 0–0.5)
LYMPHOCYTES # BLD: 0.8 K/UL (ref 0.8–3.5)
LYMPHOCYTES NFR BLD: 23 % (ref 12–49)
MCH RBC QN AUTO: 29.1 PG (ref 26–34)
MCHC RBC AUTO-ENTMCNC: 33.6 G/DL (ref 30–36.5)
MCV RBC AUTO: 86.6 FL (ref 80–99)
MONOCYTES # BLD: 0.1 K/UL (ref 0–1)
MONOCYTES NFR BLD: 2 % (ref 5–13)
NEUTS SEG # BLD: 2.4 K/UL (ref 1.8–8)
NEUTS SEG NFR BLD: 70 % (ref 32–75)
NRBC # BLD: 0 K/UL (ref 0–0.01)
NRBC BLD-RTO: 0 PER 100 WBC
PLATELET # BLD AUTO: 255 K/UL (ref 150–400)
PMV BLD AUTO: 9.9 FL (ref 8.9–12.9)
POTASSIUM SERPL-SCNC: 3.1 MMOL/L (ref 3.5–5.1)
PROT SERPL-MCNC: 7.8 G/DL (ref 6.4–8.2)
RBC # BLD AUTO: 3.88 M/UL (ref 3.8–5.2)
RBC MORPH BLD: ABNORMAL
SODIUM SERPL-SCNC: 138 MMOL/L (ref 136–145)
TROPONIN I SERPL HS-MCNC: 5 NG/L (ref 0–51)
WBC # BLD AUTO: 3.4 K/UL (ref 3.6–11)

## 2024-01-06 PROCEDURE — 2580000003 HC RX 258: Performed by: STUDENT IN AN ORGANIZED HEALTH CARE EDUCATION/TRAINING PROGRAM

## 2024-01-06 PROCEDURE — 99284 EMERGENCY DEPT VISIT MOD MDM: CPT

## 2024-01-06 PROCEDURE — 36415 COLL VENOUS BLD VENIPUNCTURE: CPT

## 2024-01-06 PROCEDURE — 93005 ELECTROCARDIOGRAM TRACING: CPT | Performed by: STUDENT IN AN ORGANIZED HEALTH CARE EDUCATION/TRAINING PROGRAM

## 2024-01-06 PROCEDURE — 96360 HYDRATION IV INFUSION INIT: CPT

## 2024-01-06 PROCEDURE — 85025 COMPLETE CBC W/AUTO DIFF WBC: CPT

## 2024-01-06 PROCEDURE — 96361 HYDRATE IV INFUSION ADD-ON: CPT

## 2024-01-06 PROCEDURE — 80053 COMPREHEN METABOLIC PANEL: CPT

## 2024-01-06 PROCEDURE — 84484 ASSAY OF TROPONIN QUANT: CPT

## 2024-01-06 RX ORDER — SODIUM CHLORIDE, SODIUM LACTATE, POTASSIUM CHLORIDE, AND CALCIUM CHLORIDE .6; .31; .03; .02 G/100ML; G/100ML; G/100ML; G/100ML
2000 INJECTION, SOLUTION INTRAVENOUS ONCE
Status: COMPLETED | OUTPATIENT
Start: 2024-01-06 | End: 2024-01-06

## 2024-01-06 RX ORDER — AMOXICILLIN 500 MG/1
500 CAPSULE ORAL 3 TIMES DAILY
COMMUNITY

## 2024-01-06 RX ADMIN — SODIUM CHLORIDE, POTASSIUM CHLORIDE, SODIUM LACTATE AND CALCIUM CHLORIDE 2000 ML: 600; 310; 30; 20 INJECTION, SOLUTION INTRAVENOUS at 10:02

## 2024-01-06 ASSESSMENT — PAIN - FUNCTIONAL ASSESSMENT: PAIN_FUNCTIONAL_ASSESSMENT: NONE - DENIES PAIN

## 2024-01-06 ASSESSMENT — LIFESTYLE VARIABLES
HOW OFTEN DO YOU HAVE A DRINK CONTAINING ALCOHOL: NEVER
HOW MANY STANDARD DRINKS CONTAINING ALCOHOL DO YOU HAVE ON A TYPICAL DAY: PATIENT DOES NOT DRINK

## 2024-01-06 NOTE — ED PROVIDER NOTES
HPI    63 y.o. female presenting with with lightheadedness, near syncope, generalized weakness and tachycardia, palpitations.  She has not had vomiting or diarrhea.  She has a history of dehydration with similar symptoms in the past in the context of her chemotherapy administration.  She finished her last cycle of chemotherapy 4 days ago.  I personally saw her for similar symptoms last month after another cycle of chemotherapy.  She feels fatigue.  She has not had fever.  Denies cough or chest pain per se.      Clary Krause   has a past medical history of Depression, FH: diabetes mellitus, Fibromyalgia, Hypercholesterolemia, Ovarian cancer (HCC), and Stroke (HCC).       Physical Exam  Vitals reviewed.   Constitutional:       General: She is not in acute distress.     Appearance: Normal appearance. She is ill-appearing. She is not toxic-appearing.      Comments: Pleasant, no distress   HENT:      Head: Normocephalic.      Mouth/Throat:      Mouth: Mucous membranes are moist.   Eyes:      Extraocular Movements: Extraocular movements intact.      Pupils: Pupils are equal, round, and reactive to light.   Cardiovascular:      Rate and Rhythm: Regular rhythm. Tachycardia present.      Pulses: Normal pulses.   Pulmonary:      Effort: Pulmonary effort is normal. No respiratory distress.   Abdominal:      General: Abdomen is flat.      Palpations: Abdomen is soft.      Tenderness: There is no abdominal tenderness.   Musculoskeletal:      Cervical back: Neck supple. No rigidity.      Right lower leg: No edema.      Left lower leg: No edema.   Skin:     General: Skin is warm.      Capillary Refill: Capillary refill takes less than 2 seconds.   Neurological:      General: No focal deficit present.      Mental Status: She is alert. Mental status is at baseline.   Psychiatric:         Mood and Affect: Mood normal.           LABORATORY TESTS:  Labs Reviewed   CBC WITH AUTO DIFFERENTIAL - Abnormal; Notable for the following  Please excuse any errors that have escaped final proofreading.     Anthony Singh MD  01/06/24 1128

## 2024-01-06 NOTE — ED TRIAGE NOTES
Pt arrives ambulatory. States that she is a chemo patient, and that she has felt like her heart has been racing, and has been dizzy/light headed since this morning. She has a history of dehydration with similar symptoms.

## 2024-05-28 ENCOUNTER — TELEPHONE (OUTPATIENT)
Dept: PRIMARY CARE CLINIC | Facility: CLINIC | Age: 64
End: 2024-05-28

## 2024-05-28 NOTE — TELEPHONE ENCOUNTER
Called patient and left a voicemail message to schedule a new patient appointment which are scheduled out until the end of August, September     ----- Message from Christa Quesada sent at 5/28/2024 10:45 AM EDT -----  Subject: Appointment Request    Reason for Call: New Patient/New to Provider Appointment needed: New   Patient Request Appointment    QUESTIONS    Reason for appointment request? No appointments available during search     Additional Information for Provider? pt needs est new pt care, please   contact asap  ---------------------------------------------------------------------------  --------------  CALL BACK INFO  [phone]; OK to leave message on voicemail  ---------------------------------------------------------------------------  --------------  SCRIPT ANSWERS

## 2024-06-18 ENCOUNTER — OFFICE VISIT (OUTPATIENT)
Dept: PRIMARY CARE CLINIC | Facility: CLINIC | Age: 64
End: 2024-06-18
Payer: MEDICARE

## 2024-06-18 VITALS
WEIGHT: 178 LBS | DIASTOLIC BLOOD PRESSURE: 73 MMHG | SYSTOLIC BLOOD PRESSURE: 129 MMHG | RESPIRATION RATE: 17 BRPM | HEART RATE: 67 BPM | HEIGHT: 68 IN | OXYGEN SATURATION: 100 % | BODY MASS INDEX: 26.98 KG/M2

## 2024-06-18 DIAGNOSIS — Z11.59 NEED FOR HEPATITIS C SCREENING TEST: ICD-10-CM

## 2024-06-18 DIAGNOSIS — Z08 ENCOUNTER FOR FOLLOW-UP EXAMINATION AFTER COMPLETED TREATMENT FOR MALIGNANT NEOPLASM: ICD-10-CM

## 2024-06-18 DIAGNOSIS — Z12.31 ENCOUNTER FOR SCREENING MAMMOGRAM FOR MALIGNANT NEOPLASM OF BREAST: ICD-10-CM

## 2024-06-18 DIAGNOSIS — Z00.00 WELL WOMAN EXAM (NO GYNECOLOGICAL EXAM): Primary | ICD-10-CM

## 2024-06-18 DIAGNOSIS — Z13.220 ENCOUNTER FOR LIPID SCREENING FOR CARDIOVASCULAR DISEASE: ICD-10-CM

## 2024-06-18 DIAGNOSIS — Z13.6 ENCOUNTER FOR LIPID SCREENING FOR CARDIOVASCULAR DISEASE: ICD-10-CM

## 2024-06-18 DIAGNOSIS — R73.09 ELEVATED GLUCOSE: ICD-10-CM

## 2024-06-18 DIAGNOSIS — Z78.0 POST-MENOPAUSAL: ICD-10-CM

## 2024-06-18 DIAGNOSIS — Z85.43 OVARIAN CANCER IN REMISSION: ICD-10-CM

## 2024-06-18 DIAGNOSIS — Z12.11 SCREENING FOR COLON CANCER: ICD-10-CM

## 2024-06-18 PROCEDURE — 99387 INIT PM E/M NEW PAT 65+ YRS: CPT | Performed by: FAMILY MEDICINE

## 2024-06-18 SDOH — ECONOMIC STABILITY: FOOD INSECURITY: WITHIN THE PAST 12 MONTHS, THE FOOD YOU BOUGHT JUST DIDN'T LAST AND YOU DIDN'T HAVE MONEY TO GET MORE.: NEVER TRUE

## 2024-06-18 SDOH — ECONOMIC STABILITY: FOOD INSECURITY: WITHIN THE PAST 12 MONTHS, YOU WORRIED THAT YOUR FOOD WOULD RUN OUT BEFORE YOU GOT MONEY TO BUY MORE.: NEVER TRUE

## 2024-06-18 SDOH — ECONOMIC STABILITY: HOUSING INSECURITY
IN THE LAST 12 MONTHS, WAS THERE A TIME WHEN YOU DID NOT HAVE A STEADY PLACE TO SLEEP OR SLEPT IN A SHELTER (INCLUDING NOW)?: NO

## 2024-06-18 SDOH — ECONOMIC STABILITY: INCOME INSECURITY: HOW HARD IS IT FOR YOU TO PAY FOR THE VERY BASICS LIKE FOOD, HOUSING, MEDICAL CARE, AND HEATING?: NOT HARD AT ALL

## 2024-06-18 ASSESSMENT — PATIENT HEALTH QUESTIONNAIRE - PHQ9
6. FEELING BAD ABOUT YOURSELF - OR THAT YOU ARE A FAILURE OR HAVE LET YOURSELF OR YOUR FAMILY DOWN: NOT AT ALL
5. POOR APPETITE OR OVEREATING: NOT AT ALL
SUM OF ALL RESPONSES TO PHQ9 QUESTIONS 1 & 2: 0
SUM OF ALL RESPONSES TO PHQ QUESTIONS 1-9: 0
7. TROUBLE CONCENTRATING ON THINGS, SUCH AS READING THE NEWSPAPER OR WATCHING TELEVISION: NOT AT ALL
10. IF YOU CHECKED OFF ANY PROBLEMS, HOW DIFFICULT HAVE THESE PROBLEMS MADE IT FOR YOU TO DO YOUR WORK, TAKE CARE OF THINGS AT HOME, OR GET ALONG WITH OTHER PEOPLE: NOT DIFFICULT AT ALL
2. FEELING DOWN, DEPRESSED OR HOPELESS: NOT AT ALL
3. TROUBLE FALLING OR STAYING ASLEEP: NOT AT ALL
SUM OF ALL RESPONSES TO PHQ QUESTIONS 1-9: 0
SUM OF ALL RESPONSES TO PHQ QUESTIONS 1-9: 0
9. THOUGHTS THAT YOU WOULD BE BETTER OFF DEAD, OR OF HURTING YOURSELF: NOT AT ALL
8. MOVING OR SPEAKING SO SLOWLY THAT OTHER PEOPLE COULD HAVE NOTICED. OR THE OPPOSITE, BEING SO FIGETY OR RESTLESS THAT YOU HAVE BEEN MOVING AROUND A LOT MORE THAN USUAL: NOT AT ALL
1. LITTLE INTEREST OR PLEASURE IN DOING THINGS: NOT AT ALL
4. FEELING TIRED OR HAVING LITTLE ENERGY: NOT AT ALL
SUM OF ALL RESPONSES TO PHQ QUESTIONS 1-9: 0

## 2024-06-18 NOTE — PROGRESS NOTES
Health Decision Maker has been checked with the patient     Primary Decision Maker: Jeana Lui - Child - [phone]       AI form was signed    Chief Complaint   Patient presents with    New Patient     Dr. Tasha Wynne - Oncology [facility]        \"Have you been to the ER, urgent care clinic since your last visit?  Hospitalized since your last visit?\"    NO    “Have you seen or consulted any other health care providers outside of [facility] since your last visit?”    NO      Vitals:    06/18/24 0926   BP: 129/73   Site: Left Upper Arm   Position: Sitting   Cuff Size: Medium Adult   Pulse: 67   Resp: 17   SpO2: 100%   Weight: 80.7 kg (178 lb)   Height: 1.727 m (5' 8\")      Depression: Not at risk (6/18/2024)    PHQ-2     PHQ-2 Score: 0      Have you had a mammogram?”   NO    No breast cancer screening on file         “Have you had a colorectal cancer screening such as a colonoscopy/FIT/Cologuard?    NO    No colonoscopy on file  No cologuard on file  No FIT/FOBT on file   No flexible sigmoidoscopy on file         Click Here for Release of Records Request        Chart reviewed: immunizations are documented.   Immunization History   Administered Date(s) Administered    Influenza Trivalent 10/08/2013    TDaP, ADACEL (age 10y-64y), BOOSTRIX (age 10y+), IM, 0.5mL 10/22/2013

## 2024-06-18 NOTE — PROGRESS NOTES
HPI     Chief Complaint   Patient presents with    New Patient     Dr. Tasha Wynne - Oncology [facility]        History of Present Illness  The patient is a 64-year-old female who is here to establish care.    The patient's medical history includes ovarian cancer, for which she is under the care of Dr. Elizabeth. She is currently in remission, with her last treatment administered on 01/03/2024. A subsequent CT scan conducted two weeks later confirmed her remission status on 01/29/2024. Her ovarian cancer was initially suspected to be a kidney stone, however, a subsequent CT scan confirmed the diagnosis.    The patient expresses a desire to have her thyroid levels checked. Her last mammogram was conducted a few years prior. She underwent a DEXA scan approximately 20 years ago. She has not received any vaccinations for COVID-19 or pneumonia. She has no history of prediabetes.   She does not smoke or drink.   Her father was diagnosed with prediabetes, but it was never insulin-dependent.       Reviewed PmHx, RxHx, FmHx, SocHx, AllgHx and updated and dated in the chart.    Physical Exam:  /73 (Site: Left Upper Arm, Position: Sitting, Cuff Size: Medium Adult)   Pulse 67   Resp 17   Ht 1.727 m (5' 8\")   Wt 80.7 kg (178 lb)   SpO2 100%   BMI 27.06 kg/m²     Physical Exam  WNWD, NAD  RRR, no murmur  CTA, no wheezes/ ronchi/ rales  Abd soft, nttp  No cervical LAD  Tms nml, pharynx and nose normal, wears dentures  No edema  Mood and affect normal   No focal deficits       Results          No results found for this or any previous visit (from the past 12 hour(s)).}        Assessment / Plan       ICD-10-CM    1. Well woman exam (no gynecological exam)  Z00.00       2. Ovarian cancer in remission  Z85.43 CBC     TSH      3. Elevated glucose  R73.09 Comprehensive Metabolic Panel     Hemoglobin A1C      4. Screening for colon cancer  Z12.11 Cologuard (Fecal DNA Colorectal Cancer Screening)      5.

## 2024-06-19 DIAGNOSIS — R94.6 ABNORMAL RESULTS OF THYROID FUNCTION STUDIES: ICD-10-CM

## 2024-06-19 DIAGNOSIS — R79.89 ABNORMAL SERUM THYROID STIMULATING HORMONE (TSH) LEVEL: Primary | ICD-10-CM

## 2024-06-19 LAB
ALBUMIN SERPL-MCNC: 3.9 G/DL (ref 3.5–5)
ALBUMIN/GLOB SERPL: 1.1 (ref 1.1–2.2)
ALP SERPL-CCNC: 113 U/L (ref 45–117)
ALT SERPL-CCNC: 14 U/L (ref 12–78)
ANION GAP SERPL CALC-SCNC: 3 MMOL/L (ref 5–15)
AST SERPL-CCNC: 14 U/L (ref 15–37)
BILIRUB SERPL-MCNC: 0.4 MG/DL (ref 0.2–1)
BUN SERPL-MCNC: 14 MG/DL (ref 6–20)
BUN/CREAT SERPL: 11 (ref 12–20)
CALCIUM SERPL-MCNC: 9.6 MG/DL (ref 8.5–10.1)
CHLORIDE SERPL-SCNC: 109 MMOL/L (ref 97–108)
CHOLEST SERPL-MCNC: 220 MG/DL
CO2 SERPL-SCNC: 28 MMOL/L (ref 21–32)
CREAT SERPL-MCNC: 1.26 MG/DL (ref 0.55–1.02)
ERYTHROCYTE [DISTWIDTH] IN BLOOD BY AUTOMATED COUNT: 13 % (ref 11.5–14.5)
EST. AVERAGE GLUCOSE BLD GHB EST-MCNC: 94 MG/DL
GLOBULIN SER CALC-MCNC: 3.7 G/DL (ref 2–4)
GLUCOSE SERPL-MCNC: 122 MG/DL (ref 65–100)
HBA1C MFR BLD: 4.9 % (ref 4–5.6)
HCT VFR BLD AUTO: 37.9 % (ref 35–47)
HCV AB SER IA-ACNC: <0.02 INDEX
HCV AB SERPL QL IA: NONREACTIVE
HDLC SERPL-MCNC: 57 MG/DL
HDLC SERPL: 3.9 (ref 0–5)
HGB BLD-MCNC: 12.5 G/DL (ref 11.5–16)
LDLC SERPL CALC-MCNC: 138.6 MG/DL (ref 0–100)
MCH RBC QN AUTO: 27.7 PG (ref 26–34)
MCHC RBC AUTO-ENTMCNC: 33 G/DL (ref 30–36.5)
MCV RBC AUTO: 84 FL (ref 80–99)
NRBC # BLD: 0 K/UL (ref 0–0.01)
NRBC BLD-RTO: 0 PER 100 WBC
PLATELET # BLD AUTO: 250 K/UL (ref 150–400)
PMV BLD AUTO: 9.8 FL (ref 8.9–12.9)
POTASSIUM SERPL-SCNC: 3.6 MMOL/L (ref 3.5–5.1)
PROT SERPL-MCNC: 7.6 G/DL (ref 6.4–8.2)
RBC # BLD AUTO: 4.51 M/UL (ref 3.8–5.2)
SODIUM SERPL-SCNC: 140 MMOL/L (ref 136–145)
TRIGL SERPL-MCNC: 122 MG/DL
TSH SERPL DL<=0.05 MIU/L-ACNC: 4.16 UIU/ML (ref 0.36–3.74)
VLDLC SERPL CALC-MCNC: 24.4 MG/DL
WBC # BLD AUTO: 4.7 K/UL (ref 3.6–11)

## 2024-06-21 DIAGNOSIS — R79.89 ABNORMAL SERUM THYROID STIMULATING HORMONE (TSH) LEVEL: ICD-10-CM

## 2024-06-21 DIAGNOSIS — R94.6 ABNORMAL RESULTS OF THYROID FUNCTION STUDIES: ICD-10-CM

## 2024-06-21 LAB — T4 FREE SERPL-MCNC: 1.2 NG/DL (ref 0.8–1.5)

## 2024-06-26 ENCOUNTER — TELEPHONE (OUTPATIENT)
Dept: PRIMARY CARE CLINIC | Facility: CLINIC | Age: 64
End: 2024-06-26

## 2024-06-26 NOTE — TELEPHONE ENCOUNTER
Spoke to patient regarding labs - Per. Dr. Whitney - It looks like this has been going on since at least May of 2023. I don't have labs before this but her first kidney function in our system was in this range. Can't say for certain about the chemo as these are not meds I monitor frequently but possible perhaps. It is not uncommon as people get into their 60s to sometimes see kidney disease. This is not severe and does not mean she is going into kidney failure just means we need to be mindful of meds that can worsen kidney function mary those over the counter like NSAIDs (advil, aleve, naproxen) and use other methods to control pain and drink plenty of fluids to keep the kidneys hydrated.

## 2024-06-26 NOTE — TELEPHONE ENCOUNTER
Patient called saying she would like a call back to let her know her labs.  Please call #[phone].

## 2024-07-17 ENCOUNTER — HOSPITAL ENCOUNTER (OUTPATIENT)
Facility: HOSPITAL | Age: 64
Discharge: HOME | End: 2024-07-20
Payer: MEDICARE

## 2024-07-17 DIAGNOSIS — Z12.31 ENCOUNTER FOR SCREENING MAMMOGRAM FOR MALIGNANT NEOPLASM OF BREAST: ICD-10-CM

## 2024-07-17 LAB — NONINV COLON CA DNA+OCC BLD SCRN STL QL: NEGATIVE

## 2024-07-17 PROCEDURE — 77063 BREAST TOMOSYNTHESIS BI: CPT

## 2025-03-21 ENCOUNTER — TELEPHONE (OUTPATIENT)
Dept: PRIMARY CARE CLINIC | Facility: CLINIC | Age: 65
End: 2025-03-21

## 2025-03-21 SDOH — HEALTH STABILITY: PHYSICAL HEALTH: ON AVERAGE, HOW MANY DAYS PER WEEK DO YOU ENGAGE IN MODERATE TO STRENUOUS EXERCISE (LIKE A BRISK WALK)?: 7 DAYS

## 2025-03-21 SDOH — HEALTH STABILITY: PHYSICAL HEALTH: ON AVERAGE, HOW MANY MINUTES DO YOU ENGAGE IN EXERCISE AT THIS LEVEL?: 20 MIN

## 2025-03-21 ASSESSMENT — LIFESTYLE VARIABLES
HOW MANY STANDARD DRINKS CONTAINING ALCOHOL DO YOU HAVE ON A TYPICAL DAY: 0
HOW OFTEN DO YOU HAVE A DRINK CONTAINING ALCOHOL: 1
HOW OFTEN DO YOU HAVE A DRINK CONTAINING ALCOHOL: NEVER
HOW OFTEN DO YOU HAVE SIX OR MORE DRINKS ON ONE OCCASION: 1
HOW MANY STANDARD DRINKS CONTAINING ALCOHOL DO YOU HAVE ON A TYPICAL DAY: PATIENT DOES NOT DRINK

## 2025-03-21 ASSESSMENT — PATIENT HEALTH QUESTIONNAIRE - PHQ9
1. LITTLE INTEREST OR PLEASURE IN DOING THINGS: NOT AT ALL
SUM OF ALL RESPONSES TO PHQ QUESTIONS 1-9: 0
2. FEELING DOWN, DEPRESSED OR HOPELESS: NOT AT ALL
SUM OF ALL RESPONSES TO PHQ QUESTIONS 1-9: 0

## 2025-03-25 ENCOUNTER — OFFICE VISIT (OUTPATIENT)
Dept: PRIMARY CARE CLINIC | Facility: CLINIC | Age: 65
End: 2025-03-25
Payer: MEDICARE

## 2025-03-25 VITALS
OXYGEN SATURATION: 99 % | BODY MASS INDEX: 27.25 KG/M2 | WEIGHT: 179.8 LBS | SYSTOLIC BLOOD PRESSURE: 123 MMHG | TEMPERATURE: 97.8 F | RESPIRATION RATE: 18 BRPM | HEART RATE: 59 BPM | DIASTOLIC BLOOD PRESSURE: 74 MMHG | HEIGHT: 68 IN

## 2025-03-25 DIAGNOSIS — E03.8 SUBCLINICAL HYPOTHYROIDISM: ICD-10-CM

## 2025-03-25 DIAGNOSIS — R73.09 ELEVATED GLUCOSE: ICD-10-CM

## 2025-03-25 DIAGNOSIS — Z00.00 MEDICARE ANNUAL WELLNESS VISIT, SUBSEQUENT: Primary | ICD-10-CM

## 2025-03-25 PROCEDURE — 1126F AMNT PAIN NOTED NONE PRSNT: CPT | Performed by: FAMILY MEDICINE

## 2025-03-25 PROCEDURE — 1159F MED LIST DOCD IN RCRD: CPT | Performed by: FAMILY MEDICINE

## 2025-03-25 PROCEDURE — 3017F COLORECTAL CA SCREEN DOC REV: CPT | Performed by: FAMILY MEDICINE

## 2025-03-25 PROCEDURE — 1036F TOBACCO NON-USER: CPT | Performed by: FAMILY MEDICINE

## 2025-03-25 PROCEDURE — 1160F RVW MEDS BY RX/DR IN RCRD: CPT | Performed by: FAMILY MEDICINE

## 2025-03-25 PROCEDURE — 1123F ACP DISCUSS/DSCN MKR DOCD: CPT | Performed by: FAMILY MEDICINE

## 2025-03-25 PROCEDURE — G8427 DOCREV CUR MEDS BY ELIG CLIN: HCPCS | Performed by: FAMILY MEDICINE

## 2025-03-25 PROCEDURE — G8400 PT W/DXA NO RESULTS DOC: HCPCS | Performed by: FAMILY MEDICINE

## 2025-03-25 PROCEDURE — G0439 PPPS, SUBSEQ VISIT: HCPCS | Performed by: FAMILY MEDICINE

## 2025-03-25 PROCEDURE — G8419 CALC BMI OUT NRM PARAM NOF/U: HCPCS | Performed by: FAMILY MEDICINE

## 2025-03-25 PROCEDURE — 99213 OFFICE O/P EST LOW 20 MIN: CPT | Performed by: FAMILY MEDICINE

## 2025-03-25 PROCEDURE — 1090F PRES/ABSN URINE INCON ASSESS: CPT | Performed by: FAMILY MEDICINE

## 2025-03-25 SDOH — ECONOMIC STABILITY: FOOD INSECURITY: WITHIN THE PAST 12 MONTHS, YOU WORRIED THAT YOUR FOOD WOULD RUN OUT BEFORE YOU GOT MONEY TO BUY MORE.: NEVER TRUE

## 2025-03-25 SDOH — ECONOMIC STABILITY: FOOD INSECURITY: WITHIN THE PAST 12 MONTHS, THE FOOD YOU BOUGHT JUST DIDN'T LAST AND YOU DIDN'T HAVE MONEY TO GET MORE.: NEVER TRUE

## 2025-03-25 ASSESSMENT — PATIENT HEALTH QUESTIONNAIRE - PHQ9
2. FEELING DOWN, DEPRESSED OR HOPELESS: NOT AT ALL
5. POOR APPETITE OR OVEREATING: NOT AT ALL
SUM OF ALL RESPONSES TO PHQ QUESTIONS 1-9: 0
SUM OF ALL RESPONSES TO PHQ QUESTIONS 1-9: 0
7. TROUBLE CONCENTRATING ON THINGS, SUCH AS READING THE NEWSPAPER OR WATCHING TELEVISION: NOT AT ALL
4. FEELING TIRED OR HAVING LITTLE ENERGY: NOT AT ALL
10. IF YOU CHECKED OFF ANY PROBLEMS, HOW DIFFICULT HAVE THESE PROBLEMS MADE IT FOR YOU TO DO YOUR WORK, TAKE CARE OF THINGS AT HOME, OR GET ALONG WITH OTHER PEOPLE: NOT DIFFICULT AT ALL
1. LITTLE INTEREST OR PLEASURE IN DOING THINGS: NOT AT ALL
9. THOUGHTS THAT YOU WOULD BE BETTER OFF DEAD, OR OF HURTING YOURSELF: NOT AT ALL
6. FEELING BAD ABOUT YOURSELF - OR THAT YOU ARE A FAILURE OR HAVE LET YOURSELF OR YOUR FAMILY DOWN: NOT AT ALL
SUM OF ALL RESPONSES TO PHQ QUESTIONS 1-9: 0
SUM OF ALL RESPONSES TO PHQ QUESTIONS 1-9: 0
3. TROUBLE FALLING OR STAYING ASLEEP: NOT AT ALL
8. MOVING OR SPEAKING SO SLOWLY THAT OTHER PEOPLE COULD HAVE NOTICED. OR THE OPPOSITE, BEING SO FIGETY OR RESTLESS THAT YOU HAVE BEEN MOVING AROUND A LOT MORE THAN USUAL: NOT AT ALL

## 2025-03-25 NOTE — PATIENT INSTRUCTIONS
F075 to learn more about \"A Healthy Heart: Care Instructions.\"  Current as of: July 31, 2024  Content Version: 14.4  © 9610-3036 [employer].   Care instructions adapted under license by Alma Johns. If you have questions about a medical condition or this instruction, always ask your healthcare professional. [employer] disclaims any warranty or liability for your use of this information.    Personalized Preventive Plan for Clary Krause - 3/25/2025  Medicare offers a range of preventive health benefits. Some of the tests and screenings are paid in full while other may be subject to a deductible, co-insurance, and/or copay.  Some of these benefits include a comprehensive review of your medical history including lifestyle, illnesses that may run in your family, and various assessments and screenings as appropriate.  After reviewing your medical record and screening and assessments performed today your provider may have ordered immunizations, labs, imaging, and/or referrals for you.  A list of these orders (if applicable) as well as your Preventive Care list are included within your After Visit Summary for your review.

## 2025-03-25 NOTE — PROGRESS NOTES
Health Decision Maker has been checked with the patient   Primary Decision Maker: Jeana Lui - Child - [phone]     AI form was signed    Chief Complaint   Patient presents with    Medicare AWV       \"Have you been to the ER, urgent care clinic since your last visit?  Hospitalized since your last visit?\"    NO    “Have you seen or consulted any other health care providers outside of [facility] since your last visit?”    NO      Vitals:    03/25/25 0927   Temp: 97.8 °F (36.6 °C)   TempSrc: Oral   Weight: 81.6 kg (179 lb 12.8 oz)   Height: 1.727 m (5' 8\")      Depression: Not at risk (3/25/2025)    PHQ-2     PHQ-2 Score: 0              Click Here for Release of Records Request    Chart reviewed: immunizations are documented.   Immunization History   Administered Date(s) Administered    Influenza Trivalent 10/08/2013    TDaP, ADACEL (age 10y-64y), BOOSTRIX (age 10y+), IM, 0.5mL 10/22/2013

## 2025-03-25 NOTE — PROGRESS NOTES
Medicare Annual Wellness Visit    Clary LEARY Abromaitis is here for Medicare AWV    Assessment & Plan   Medicare annual wellness visit, subsequent  -     CBC; Future  -     Lipid Panel; Future  Elevated glucose  -     Comprehensive Metabolic Panel; Future  -     Hemoglobin A1C; Future  Subclinical hypothyroidism - Not on meds. Has been monitoring.   -     TSH; Future  -     T4, Free; Future     No follow-ups on file.     Subjective     Does have mild R ankles swelling. Has known RA. Props it up and it goes away. Denies pain, redness, warmth or swelling in calf. States this has been ongoing for many months.     Has subclinical hypothyroidism. Not on meds.     Has elevated glucose on previous labs.     Patient's complete Health Risk Assessment and screening values have been reviewed and are found in Flowsheets. The following problems were reviewed today and where indicated follow up appointments were made and/or referrals ordered.    - Tdap vaccine declines  - Flu vaccine declines  - Shingles vaccine declines  - PNA Vaccine declines  - DEXA order in placed  - COVID Vaccine declines                    Hearing Screen:  Do you or your family notice any trouble with your hearing that hasn't been managed with hearing aids?: (!) (Proxy-Rptd) Yes    Interventions:  Does not want to see Audiology.                Objective   Vitals:    03/25/25 0927   BP: 123/74   Pulse: 59   Resp: 18   Temp: 97.8 °F (36.6 °C)   TempSrc: Oral   SpO2: 99%   Weight: 81.6 kg (179 lb 12.8 oz)   Height: 1.727 m (5' 8\")      Body mass index is 27.34 kg/m².        WNWD, NAD  Regular rhythm, lili, no murmur  CTA, no wheezes/ ronchi/ rales  Abd soft, nttp  Tms nml, pharynx nml, nose nml  No cervical LAD  Very minimal non pitting edema around R lateral malleolus, no warmth/ redness/ pain in R calf, no unilateral leg swelling  No focal deficits  Mood/ affect nml            No Known Allergies  Prior to Visit Medications    Not on File       CareTeam

## 2025-07-24 ENCOUNTER — RESULTS FOLLOW-UP (OUTPATIENT)
Dept: PRIMARY CARE CLINIC | Facility: CLINIC | Age: 65
End: 2025-07-24

## 2025-07-24 ENCOUNTER — HOSPITAL ENCOUNTER (OUTPATIENT)
Facility: HOSPITAL | Age: 65
Discharge: HOME | End: 2025-07-27
Payer: MEDICARE

## 2025-07-24 DIAGNOSIS — Z12.31 OTHER SCREENING MAMMOGRAM: ICD-10-CM

## 2025-07-24 PROCEDURE — 77063 BREAST TOMOSYNTHESIS BI: CPT
